# Patient Record
Sex: FEMALE | Race: WHITE | NOT HISPANIC OR LATINO | Employment: FULL TIME
[De-identification: names, ages, dates, MRNs, and addresses within clinical notes are randomized per-mention and may not be internally consistent; named-entity substitution may affect disease eponyms.]

---

## 2017-06-07 ENCOUNTER — IMAGING SERVICES (OUTPATIENT)
Dept: OTHER | Age: 52
End: 2017-06-07

## 2017-06-07 ENCOUNTER — HOSPITAL ENCOUNTER (OUTPATIENT)
Dept: MAMMOGRAPHY | Age: 52
Discharge: HOME | End: 2017-06-07
Attending: INTERNAL MEDICINE
Payer: COMMERCIAL

## 2017-06-07 ENCOUNTER — HOSPITAL ENCOUNTER (OUTPATIENT)
Dept: BONE DENSITY | Age: 52
Discharge: HOME | End: 2017-06-07
Attending: INTERNAL MEDICINE
Payer: COMMERCIAL

## 2017-06-07 DIAGNOSIS — M81.0 OSTEOPOROSIS: ICD-10-CM

## 2017-06-07 DIAGNOSIS — Z12.31 ENCOUNTER FOR SCREENING MAMMOGRAM FOR MALIGNANT NEOPLASM OF BREAST: ICD-10-CM

## 2017-06-07 PROCEDURE — 77080 DXA BONE DENSITY AXIAL: CPT | Performed by: INTERNAL MEDICINE

## 2017-06-07 PROCEDURE — 77067 SCR MAMMO BI INCL CAD: CPT | Performed by: INTERNAL MEDICINE

## 2017-06-19 ENCOUNTER — HOSPITAL ENCOUNTER (OUTPATIENT)
Dept: ULTRASOUND IMAGING | Age: 52
Discharge: HOME | End: 2017-06-19
Attending: INTERNAL MEDICINE
Payer: COMMERCIAL

## 2017-06-19 ENCOUNTER — IMAGING SERVICES (OUTPATIENT)
Dept: OTHER | Age: 52
End: 2017-06-19

## 2017-06-19 ENCOUNTER — HOSPITAL ENCOUNTER (OUTPATIENT)
Dept: MAMMOGRAPHY | Age: 52
Discharge: HOME | End: 2017-06-19
Attending: INTERNAL MEDICINE
Payer: COMMERCIAL

## 2017-06-19 DIAGNOSIS — R92.2 INCONCLUSIVE MAMMOGRAM: ICD-10-CM

## 2017-06-19 PROCEDURE — 76641 ULTRASOUND BREAST COMPLETE: CPT | Performed by: INTERNAL MEDICINE

## 2017-06-19 PROCEDURE — 77062 BREAST TOMOSYNTHESIS BI: CPT | Performed by: INTERNAL MEDICINE

## 2017-06-19 PROCEDURE — 77066 DX MAMMO INCL CAD BI: CPT | Performed by: INTERNAL MEDICINE

## 2017-07-31 ENCOUNTER — OCC HEALTH (OUTPATIENT)
Dept: OCCUPATIONAL MEDICINE | Age: 52
End: 2017-07-31
Attending: PHYSICIAN ASSISTANT

## 2020-02-20 ENCOUNTER — OFFICE VISIT (OUTPATIENT)
Dept: FAMILY MEDICINE CLINIC | Facility: CLINIC | Age: 55
End: 2020-02-20
Payer: COMMERCIAL

## 2020-02-20 VITALS
WEIGHT: 227.19 LBS | HEIGHT: 64 IN | BODY MASS INDEX: 38.79 KG/M2 | OXYGEN SATURATION: 99 % | DIASTOLIC BLOOD PRESSURE: 92 MMHG | TEMPERATURE: 99 F | RESPIRATION RATE: 16 BRPM | SYSTOLIC BLOOD PRESSURE: 150 MMHG | HEART RATE: 87 BPM

## 2020-02-20 DIAGNOSIS — Z20.828 EXPOSURE TO INFLUENZA: ICD-10-CM

## 2020-02-20 DIAGNOSIS — J02.9 SORE THROAT: Primary | ICD-10-CM

## 2020-02-20 LAB
CONTROL LINE PRESENT WITH A CLEAR BACKGROUND (YES/NO): YES YES/NO
KIT LOT #: NORMAL NUMERIC

## 2020-02-20 PROCEDURE — 99202 OFFICE O/P NEW SF 15 MIN: CPT | Performed by: NURSE PRACTITIONER

## 2020-02-20 PROCEDURE — 87880 STREP A ASSAY W/OPTIC: CPT | Performed by: NURSE PRACTITIONER

## 2020-02-20 RX ORDER — BUPROPION HYDROCHLORIDE 75 MG/1
75 TABLET ORAL
COMMUNITY
Start: 2020-02-12

## 2020-02-20 RX ORDER — FLUOXETINE 10 MG/1
10 CAPSULE ORAL
COMMUNITY
Start: 2020-01-15

## 2020-02-20 RX ORDER — OSELTAMIVIR PHOSPHATE 75 MG/1
75 CAPSULE ORAL DAILY
Qty: 10 CAPSULE | Refills: 0 | Status: SHIPPED | OUTPATIENT
Start: 2020-02-20 | End: 2020-03-01

## 2020-02-20 NOTE — PATIENT INSTRUCTIONS
Self-Care for Sore Throats    Sore throats happen for many reasons, such as colds, allergies, and infections caused by viruses or bacteria. In any case, your throat becomes red and sore.  Your goal for self-care is to reduce your discomfort while giving y over 101°F (38.3°C)  · White spots on the throat  · Great difficulty swallowing  · Trouble breathing  · A skin rash  · Recent exposure to someone else with strep bacteria  · Severe hoarseness and swollen glands in the neck or jaw  Date Last Reviewed: 8/1/2 get colds in the winter and spring, but it is possible to get a cold any time of the year. Symptoms usually include:    *sore throat  *runny nose  *coughing  *sneezing  *headaches  *body aches    Most people recover within about 7-10 days.  However, people shaking hands. Move away from people before coughing or sneezing. Cough and sneeze into a tissue then throw it away, or cough and sneeze into your upper shirt sleeve, completely covering your mouth and nose.   Wash your hands after coughing, sneezing, or common cold, but rhinoviruses are the most common. Rhinoviruses can also trigger asthma attacks and have been linked to sinus and ear infections.  Other viruses that can cause colds include respiratory syncytial virus, human parainfluenza viruses, adenoviru

## 2022-09-24 ENCOUNTER — TELEPHONE (OUTPATIENT)
Dept: SCHEDULING | Age: 57
End: 2022-09-24

## 2022-10-05 RX ORDER — BUPROPION HYDROCHLORIDE 300 MG/1
TABLET ORAL
Qty: 90 TABLET | Refills: 1 | Status: SHIPPED | OUTPATIENT
Start: 2022-10-05 | End: 2023-05-30 | Stop reason: SDUPTHER

## 2022-10-14 ENCOUNTER — TELEPHONE (OUTPATIENT)
Dept: SCHEDULING | Age: 57
End: 2022-10-14

## 2022-10-25 RX ORDER — LOSARTAN POTASSIUM AND HYDROCHLOROTHIAZIDE 12.5; 5 MG/1; MG/1
TABLET ORAL
Qty: 90 TABLET | Refills: 1 | Status: SHIPPED | OUTPATIENT
Start: 2022-10-25 | End: 2023-04-03

## 2023-01-17 RX ORDER — MONTELUKAST SODIUM 10 MG/1
TABLET ORAL
Qty: 90 TABLET | Refills: 1 | Status: SHIPPED | OUTPATIENT
Start: 2023-01-17 | End: 2023-05-30

## 2023-02-21 ENCOUNTER — APPOINTMENT (OUTPATIENT)
Dept: INTERNAL MEDICINE | Age: 58
End: 2023-02-21

## 2023-04-03 ENCOUNTER — APPOINTMENT (OUTPATIENT)
Dept: INTERNAL MEDICINE | Age: 58
End: 2023-04-03

## 2023-04-03 RX ORDER — LOSARTAN POTASSIUM AND HYDROCHLOROTHIAZIDE 12.5; 5 MG/1; MG/1
TABLET ORAL
Qty: 90 TABLET | Refills: 1 | Status: SHIPPED | OUTPATIENT
Start: 2023-04-03 | End: 2023-11-02

## 2023-05-24 ENCOUNTER — OFFICE VISIT (OUTPATIENT)
Dept: INTERNAL MEDICINE | Age: 58
End: 2023-05-24

## 2023-05-24 ENCOUNTER — LAB SERVICES (OUTPATIENT)
Dept: LAB | Age: 58
End: 2023-05-24

## 2023-05-24 VITALS
BODY MASS INDEX: 44.66 KG/M2 | TEMPERATURE: 97.9 F | DIASTOLIC BLOOD PRESSURE: 84 MMHG | HEIGHT: 64 IN | HEART RATE: 79 BPM | WEIGHT: 261.58 LBS | OXYGEN SATURATION: 98 % | RESPIRATION RATE: 18 BRPM | SYSTOLIC BLOOD PRESSURE: 128 MMHG

## 2023-05-24 DIAGNOSIS — Z11.59 NEED FOR HEPATITIS C SCREENING TEST: ICD-10-CM

## 2023-05-24 DIAGNOSIS — Z12.31 SCREENING MAMMOGRAM FOR BREAST CANCER: ICD-10-CM

## 2023-05-24 DIAGNOSIS — E55.9 VITAMIN D DEFICIENCY DISEASE: ICD-10-CM

## 2023-05-24 DIAGNOSIS — Z12.11 SPECIAL SCREENING FOR MALIGNANT NEOPLASM OF COLON: ICD-10-CM

## 2023-05-24 DIAGNOSIS — R73.9 HYPERGLYCEMIA: ICD-10-CM

## 2023-05-24 DIAGNOSIS — Z00.00 ENCOUNTER FOR GENERAL ADULT MEDICAL EXAMINATION W/O ABNORMAL FINDINGS: Primary | ICD-10-CM

## 2023-05-24 DIAGNOSIS — Z23 NEED FOR VACCINATION: ICD-10-CM

## 2023-05-24 DIAGNOSIS — Z00.00 ENCOUNTER FOR GENERAL ADULT MEDICAL EXAMINATION W/O ABNORMAL FINDINGS: ICD-10-CM

## 2023-05-24 DIAGNOSIS — Z13.820 OSTEOPOROSIS SCREENING: ICD-10-CM

## 2023-05-24 PROCEDURE — 90715 TDAP VACCINE 7 YRS/> IM: CPT | Performed by: INTERNAL MEDICINE

## 2023-05-24 PROCEDURE — 36415 COLL VENOUS BLD VENIPUNCTURE: CPT | Performed by: INTERNAL MEDICINE

## 2023-05-24 PROCEDURE — 82306 VITAMIN D 25 HYDROXY: CPT | Performed by: INTERNAL MEDICINE

## 2023-05-24 PROCEDURE — 99386 PREV VISIT NEW AGE 40-64: CPT | Performed by: INTERNAL MEDICINE

## 2023-05-24 PROCEDURE — 80050 GENERAL HEALTH PANEL: CPT | Performed by: INTERNAL MEDICINE

## 2023-05-24 PROCEDURE — 90471 IMMUNIZATION ADMIN: CPT | Performed by: INTERNAL MEDICINE

## 2023-05-24 PROCEDURE — 80061 LIPID PANEL: CPT | Performed by: INTERNAL MEDICINE

## 2023-05-24 PROCEDURE — 88175 CYTOPATH C/V AUTO FLUID REDO: CPT | Performed by: INTERNAL MEDICINE

## 2023-05-24 PROCEDURE — 83036 HEMOGLOBIN GLYCOSYLATED A1C: CPT | Performed by: INTERNAL MEDICINE

## 2023-05-24 PROCEDURE — 84439 ASSAY OF FREE THYROXINE: CPT | Performed by: INTERNAL MEDICINE

## 2023-05-24 RX ORDER — ALPRAZOLAM 0.5 MG/1
TABLET ORAL
COMMUNITY
End: 2023-05-26 | Stop reason: SDUPTHER

## 2023-05-24 RX ORDER — ALBUTEROL SULFATE 90 UG/1
AEROSOL, METERED RESPIRATORY (INHALATION)
COMMUNITY
End: 2023-05-26 | Stop reason: SDUPTHER

## 2023-05-24 ASSESSMENT — ENCOUNTER SYMPTOMS
PSYCHIATRIC NEGATIVE: 1
GASTROINTESTINAL NEGATIVE: 1
HEMATOLOGIC/LYMPHATIC NEGATIVE: 1
ALLERGIC/IMMUNOLOGIC NEGATIVE: 1
ENDOCRINE NEGATIVE: 1
NEUROLOGICAL NEGATIVE: 1
CONSTITUTIONAL NEGATIVE: 1
EYES NEGATIVE: 1
RESPIRATORY NEGATIVE: 1

## 2023-05-24 ASSESSMENT — PATIENT HEALTH QUESTIONNAIRE - PHQ9
SUM OF ALL RESPONSES TO PHQ9 QUESTIONS 1 AND 2: 0
1. LITTLE INTEREST OR PLEASURE IN DOING THINGS: NOT AT ALL
2. FEELING DOWN, DEPRESSED OR HOPELESS: NOT AT ALL
SUM OF ALL RESPONSES TO PHQ9 QUESTIONS 1 AND 2: 0
CLINICAL INTERPRETATION OF PHQ2 SCORE: NO FURTHER SCREENING NEEDED

## 2023-05-24 ASSESSMENT — PAIN SCALES - GENERAL: PAINLEVEL: 0

## 2023-05-25 LAB
25(OH)D3+25(OH)D2 SERPL-MCNC: 32.6 NG/ML (ref 30–100)
ALBUMIN SERPL-MCNC: 3.5 G/DL (ref 3.6–5.1)
ALBUMIN/GLOB SERPL: 1.1 {RATIO} (ref 1–2.4)
ALP SERPL-CCNC: 114 UNITS/L (ref 45–117)
ALT SERPL-CCNC: 30 UNITS/L
ANION GAP SERPL CALC-SCNC: 6 MMOL/L (ref 7–19)
AST SERPL-CCNC: 17 UNITS/L
BASOPHILS # BLD: 0.1 K/MCL (ref 0–0.3)
BASOPHILS NFR BLD: 1 %
BILIRUB SERPL-MCNC: 0.2 MG/DL (ref 0.2–1)
BUN SERPL-MCNC: 15 MG/DL (ref 6–20)
BUN/CREAT SERPL: 23 (ref 7–25)
CALCIUM SERPL-MCNC: 9.5 MG/DL (ref 8.4–10.2)
CHLORIDE SERPL-SCNC: 103 MMOL/L (ref 97–110)
CHOLEST SERPL-MCNC: 210 MG/DL
CHOLEST/HDLC SERPL: 2.1 {RATIO}
CO2 SERPL-SCNC: 30 MMOL/L (ref 21–32)
CREAT SERPL-MCNC: 0.64 MG/DL (ref 0.51–0.95)
DEPRECATED RDW RBC: 47.2 FL (ref 39–50)
EOSINOPHIL # BLD: 0.3 K/MCL (ref 0–0.5)
EOSINOPHIL NFR BLD: 3 %
ERYTHROCYTE [DISTWIDTH] IN BLOOD: 13.4 % (ref 11–15)
FASTING DURATION TIME PATIENT: ABNORMAL H
GFR SERPLBLD BASED ON 1.73 SQ M-ARVRAT: >90 ML/MIN
GLOBULIN SER-MCNC: 3.2 G/DL (ref 2–4)
GLUCOSE SERPL-MCNC: 123 MG/DL (ref 70–99)
HBA1C MFR BLD: 6.5 % (ref 4.5–5.6)
HCT VFR BLD CALC: 45.2 % (ref 36–46.5)
HDLC SERPL-MCNC: 99 MG/DL
HGB BLD-MCNC: 14.1 G/DL (ref 12–15.5)
IMM GRANULOCYTES # BLD AUTO: 0 K/MCL (ref 0–0.2)
IMM GRANULOCYTES # BLD: 0 %
LDLC SERPL CALC-MCNC: 99 MG/DL
LYMPHOCYTES # BLD: 2.3 K/MCL (ref 1–4)
LYMPHOCYTES NFR BLD: 25 %
MCH RBC QN AUTO: 29.9 PG (ref 26–34)
MCHC RBC AUTO-ENTMCNC: 31.2 G/DL (ref 32–36.5)
MCV RBC AUTO: 95.8 FL (ref 78–100)
MONOCYTES # BLD: 0.5 K/MCL (ref 0.3–0.9)
MONOCYTES NFR BLD: 5 %
NEUTROPHILS # BLD: 6.2 K/MCL (ref 1.8–7.7)
NEUTROPHILS NFR BLD: 66 %
NONHDLC SERPL-MCNC: 111 MG/DL
NRBC BLD MANUAL-RTO: 0 /100 WBC
PLATELET # BLD AUTO: 365 K/MCL (ref 140–450)
POTASSIUM SERPL-SCNC: 4.8 MMOL/L (ref 3.4–5.1)
PROT SERPL-MCNC: 6.7 G/DL (ref 6.4–8.2)
RBC # BLD: 4.72 MIL/MCL (ref 4–5.2)
SODIUM SERPL-SCNC: 134 MMOL/L (ref 135–145)
T4 FREE SERPL-MCNC: 1 NG/DL (ref 0.8–1.5)
TRIGL SERPL-MCNC: 62 MG/DL
TSH SERPL-ACNC: 2.4 MCUNITS/ML (ref 0.35–5)
WBC # BLD: 9.4 K/MCL (ref 4.2–11)

## 2023-05-26 DIAGNOSIS — J45.909 UNCOMPLICATED ASTHMA, UNSPECIFIED ASTHMA SEVERITY, UNSPECIFIED WHETHER PERSISTENT: Primary | ICD-10-CM

## 2023-05-26 LAB — HOLD SPECIMEN: NORMAL

## 2023-05-26 RX ORDER — ALBUTEROL SULFATE 90 UG/1
2 AEROSOL, METERED RESPIRATORY (INHALATION) EVERY 4 HOURS PRN
Qty: 1 EACH | Refills: 1 | Status: SHIPPED | OUTPATIENT
Start: 2023-05-26 | End: 2023-11-02 | Stop reason: SDUPTHER

## 2023-05-26 RX ORDER — ALPRAZOLAM 0.5 MG/1
0.5 TABLET ORAL NIGHTLY PRN
Qty: 30 TABLET | Refills: 0 | Status: SHIPPED | OUTPATIENT
Start: 2023-05-26 | End: 2023-11-02 | Stop reason: SDUPTHER

## 2023-05-26 RX ORDER — DICLOFENAC SODIUM 75 MG/1
75 TABLET, DELAYED RELEASE ORAL
COMMUNITY
Start: 2023-04-27 | End: 2023-05-27

## 2023-05-27 DIAGNOSIS — J30.1 SEASONAL ALLERGIC RHINITIS DUE TO POLLEN: Primary | ICD-10-CM

## 2023-05-30 ENCOUNTER — TELEPHONE (OUTPATIENT)
Dept: SURGERY | Age: 58
End: 2023-05-30

## 2023-05-30 LAB
CASE RPRT: NORMAL
CLINICAL INFO: NORMAL
CYTOLOGY CVX/VAG STUDY: NORMAL
PAP EDUCATIONAL NOTE: NORMAL
SPECIMEN ADEQUACY: NORMAL

## 2023-05-30 RX ORDER — BUPROPION HYDROCHLORIDE 300 MG/1
300 TABLET ORAL DAILY
Qty: 90 TABLET | Refills: 1 | Status: SHIPPED | OUTPATIENT
Start: 2023-05-30 | End: 2023-07-12 | Stop reason: DRUGHIGH

## 2023-05-30 RX ORDER — MONTELUKAST SODIUM 10 MG/1
TABLET ORAL
Qty: 90 TABLET | Refills: 1 | Status: SHIPPED | OUTPATIENT
Start: 2023-05-30

## 2023-05-31 ENCOUNTER — EXTERNAL RECORD (OUTPATIENT)
Dept: OTHER | Age: 58
End: 2023-05-31

## 2023-07-13 RX ORDER — BUPROPION HYDROCHLORIDE 450 MG/1
450 TABLET, FILM COATED, EXTENDED RELEASE ORAL DAILY
Qty: 90 TABLET | Refills: 3 | Status: SHIPPED | OUTPATIENT
Start: 2023-07-13 | End: 2023-11-02 | Stop reason: SDUPTHER

## 2023-10-03 ENCOUNTER — IMAGING SERVICES (OUTPATIENT)
Dept: MAMMOGRAPHY | Age: 58
End: 2023-10-03

## 2023-10-03 DIAGNOSIS — Z12.31 SCREENING MAMMOGRAM FOR BREAST CANCER: ICD-10-CM

## 2023-10-03 PROCEDURE — 77063 BREAST TOMOSYNTHESIS BI: CPT | Performed by: RADIOLOGY

## 2023-10-03 PROCEDURE — 77067 SCR MAMMO BI INCL CAD: CPT | Performed by: RADIOLOGY

## 2023-10-09 ENCOUNTER — TELEPHONE (OUTPATIENT)
Dept: FAMILY MEDICINE | Age: 58
End: 2023-10-09

## 2023-11-02 DIAGNOSIS — J45.909 UNCOMPLICATED ASTHMA, UNSPECIFIED ASTHMA SEVERITY, UNSPECIFIED WHETHER PERSISTENT: ICD-10-CM

## 2023-11-02 RX ORDER — ALBUTEROL SULFATE 90 UG/1
2 AEROSOL, METERED RESPIRATORY (INHALATION) EVERY 4 HOURS PRN
Qty: 1 EACH | Refills: 1 | Status: SHIPPED | OUTPATIENT
Start: 2023-11-02 | End: 2023-12-02

## 2023-11-02 RX ORDER — LOSARTAN POTASSIUM AND HYDROCHLOROTHIAZIDE 12.5; 5 MG/1; MG/1
TABLET ORAL
Qty: 90 TABLET | Refills: 1 | Status: SHIPPED | OUTPATIENT
Start: 2023-11-02

## 2023-11-02 RX ORDER — BUPROPION HYDROCHLORIDE 450 MG/1
450 TABLET, FILM COATED, EXTENDED RELEASE ORAL DAILY
Qty: 90 TABLET | Refills: 3 | Status: SHIPPED | OUTPATIENT
Start: 2023-11-02

## 2023-11-02 RX ORDER — ALPRAZOLAM 0.5 MG/1
0.5 TABLET ORAL NIGHTLY PRN
Qty: 30 TABLET | Refills: 0 | OUTPATIENT
Start: 2023-11-02

## 2023-11-02 RX ORDER — ALPRAZOLAM 0.5 MG/1
0.5 TABLET ORAL
Qty: 30 TABLET | Refills: 0 | OUTPATIENT
Start: 2023-11-02

## 2023-11-02 RX ORDER — LOSARTAN POTASSIUM AND HYDROCHLOROTHIAZIDE 12.5; 5 MG/1; MG/1
1 TABLET ORAL DAILY
Qty: 90 TABLET | Refills: 1 | OUTPATIENT
Start: 2023-11-02

## 2023-11-30 ENCOUNTER — V-VISIT (OUTPATIENT)
Dept: FAMILY MEDICINE | Age: 58
End: 2023-11-30

## 2023-11-30 VITALS — HEIGHT: 64 IN | WEIGHT: 257 LBS | BODY MASS INDEX: 43.87 KG/M2

## 2023-11-30 DIAGNOSIS — F32.5 MAJOR DEPRESSIVE DISORDER IN FULL REMISSION, UNSPECIFIED WHETHER RECURRENT (CMD): ICD-10-CM

## 2023-11-30 DIAGNOSIS — R53.83 OTHER FATIGUE: Primary | ICD-10-CM

## 2023-11-30 DIAGNOSIS — R06.81 APNEA: ICD-10-CM

## 2023-11-30 DIAGNOSIS — R73.9 HYPERGLYCEMIA: ICD-10-CM

## 2023-11-30 PROCEDURE — 99214 OFFICE O/P EST MOD 30 MIN: CPT | Performed by: FAMILY MEDICINE

## 2023-11-30 RX ORDER — BUPROPION HYDROCHLORIDE 300 MG/1
300 TABLET ORAL DAILY
Qty: 90 TABLET | Refills: 3 | Status: SHIPPED | OUTPATIENT
Start: 2023-11-30

## 2023-11-30 ASSESSMENT — ENCOUNTER SYMPTOMS
RESPIRATORY NEGATIVE: 1
PSYCHIATRIC NEGATIVE: 1
CONSTITUTIONAL NEGATIVE: 1
NEUROLOGICAL NEGATIVE: 1
GASTROINTESTINAL NEGATIVE: 1

## 2023-11-30 ASSESSMENT — PATIENT HEALTH QUESTIONNAIRE - PHQ9
1. LITTLE INTEREST OR PLEASURE IN DOING THINGS: NOT AT ALL
SUM OF ALL RESPONSES TO PHQ9 QUESTIONS 1 AND 2: 0
SUM OF ALL RESPONSES TO PHQ9 QUESTIONS 1 AND 2: 0
CLINICAL INTERPRETATION OF PHQ2 SCORE: NO FURTHER SCREENING NEEDED
2. FEELING DOWN, DEPRESSED OR HOPELESS: NOT AT ALL

## 2023-12-04 ENCOUNTER — LAB SERVICES (OUTPATIENT)
Dept: LAB | Age: 58
End: 2023-12-04

## 2023-12-04 DIAGNOSIS — R73.9 HYPERGLYCEMIA: ICD-10-CM

## 2023-12-04 PROCEDURE — 36415 COLL VENOUS BLD VENIPUNCTURE: CPT | Performed by: FAMILY MEDICINE

## 2023-12-04 PROCEDURE — 83036 HEMOGLOBIN GLYCOSYLATED A1C: CPT | Performed by: CLINICAL MEDICAL LABORATORY

## 2023-12-05 ENCOUNTER — TELEPHONE (OUTPATIENT)
Dept: FAMILY MEDICINE | Age: 58
End: 2023-12-05

## 2023-12-05 LAB — HBA1C MFR BLD: 6.7 % (ref 4.5–5.6)

## 2023-12-07 ENCOUNTER — OFFICE VISIT (OUTPATIENT)
Dept: FAMILY MEDICINE | Age: 58
End: 2023-12-07

## 2023-12-07 VITALS
BODY MASS INDEX: 43.55 KG/M2 | HEIGHT: 64 IN | WEIGHT: 255.07 LBS | SYSTOLIC BLOOD PRESSURE: 119 MMHG | DIASTOLIC BLOOD PRESSURE: 78 MMHG | HEART RATE: 95 BPM | RESPIRATION RATE: 16 BRPM | OXYGEN SATURATION: 95 %

## 2023-12-07 DIAGNOSIS — E11.9 NEW ONSET TYPE 2 DIABETES MELLITUS (CMD): Primary | Chronic | ICD-10-CM

## 2023-12-07 DIAGNOSIS — E55.9 VITAMIN D DEFICIENCY DISEASE: ICD-10-CM

## 2023-12-07 DIAGNOSIS — E66.01 CLASS 3 SEVERE OBESITY DUE TO EXCESS CALORIES WITH BODY MASS INDEX (BMI) OF 40.0 TO 44.9 IN ADULT, UNSPECIFIED WHETHER SERIOUS COMORBIDITY PRESENT (CMD): Chronic | ICD-10-CM

## 2023-12-07 DIAGNOSIS — R06.83 SNORING: ICD-10-CM

## 2023-12-07 DIAGNOSIS — R53.82 CHRONIC FATIGUE: Chronic | ICD-10-CM

## 2023-12-07 PROBLEM — E66.9 OBESITY: Status: ACTIVE | Noted: 2023-12-07

## 2023-12-07 PROBLEM — F32.A DEPRESSION: Status: ACTIVE | Noted: 2023-12-07

## 2023-12-07 PROBLEM — M81.0 OSTEOPOROSIS: Status: ACTIVE | Noted: 2023-12-07

## 2023-12-07 PROBLEM — I10 ESSENTIAL HYPERTENSION: Status: ACTIVE | Noted: 2023-12-07

## 2023-12-07 PROBLEM — E78.2 HYPERLIPEMIA, MIXED: Status: ACTIVE | Noted: 2023-12-07

## 2023-12-07 PROCEDURE — 3074F SYST BP LT 130 MM HG: CPT | Performed by: STUDENT IN AN ORGANIZED HEALTH CARE EDUCATION/TRAINING PROGRAM

## 2023-12-07 PROCEDURE — 99214 OFFICE O/P EST MOD 30 MIN: CPT | Performed by: STUDENT IN AN ORGANIZED HEALTH CARE EDUCATION/TRAINING PROGRAM

## 2023-12-07 PROCEDURE — 3078F DIAST BP <80 MM HG: CPT | Performed by: STUDENT IN AN ORGANIZED HEALTH CARE EDUCATION/TRAINING PROGRAM

## 2023-12-07 RX ORDER — SEMAGLUTIDE 1.34 MG/ML
INJECTION, SOLUTION SUBCUTANEOUS
Qty: 5 ML | Refills: 3 | Status: SHIPPED | OUTPATIENT
Start: 2023-12-07

## 2023-12-07 ASSESSMENT — PATIENT HEALTH QUESTIONNAIRE - PHQ9
CLINICAL INTERPRETATION OF PHQ2 SCORE: NO FURTHER SCREENING NEEDED
SUM OF ALL RESPONSES TO PHQ9 QUESTIONS 1 AND 2: 0
SUM OF ALL RESPONSES TO PHQ9 QUESTIONS 1 AND 2: 0
1. LITTLE INTEREST OR PLEASURE IN DOING THINGS: NOT AT ALL
2. FEELING DOWN, DEPRESSED OR HOPELESS: NOT AT ALL

## 2023-12-07 ASSESSMENT — ENCOUNTER SYMPTOMS: FATIGUE: 1

## 2023-12-15 ENCOUNTER — TELEPHONE (OUTPATIENT)
Dept: INTERNAL MEDICINE | Age: 58
End: 2023-12-15

## 2024-01-01 ENCOUNTER — EXTERNAL RECORD (OUTPATIENT)
Dept: HEALTH INFORMATION MANAGEMENT | Facility: OTHER | Age: 59
End: 2024-01-01

## 2024-01-07 ENCOUNTER — EXTERNAL RECORD (OUTPATIENT)
Dept: HEALTH INFORMATION MANAGEMENT | Facility: OTHER | Age: 59
End: 2024-01-07

## 2024-01-10 ENCOUNTER — TELEPHONE (OUTPATIENT)
Dept: FAMILY MEDICINE | Age: 59
End: 2024-01-10

## 2024-01-10 DIAGNOSIS — G47.33 OSA (OBSTRUCTIVE SLEEP APNEA): Primary | Chronic | ICD-10-CM

## 2024-01-11 ENCOUNTER — TELEPHONE (OUTPATIENT)
Dept: INTERNAL MEDICINE | Age: 59
End: 2024-01-11

## 2024-01-16 DIAGNOSIS — J30.1 SEASONAL ALLERGIC RHINITIS DUE TO POLLEN: ICD-10-CM

## 2024-01-16 RX ORDER — MONTELUKAST SODIUM 10 MG/1
TABLET ORAL
Qty: 90 TABLET | Refills: 1 | Status: SHIPPED | OUTPATIENT
Start: 2024-01-16

## 2024-01-17 ENCOUNTER — E-ADVICE (OUTPATIENT)
Dept: FAMILY MEDICINE | Age: 59
End: 2024-01-17

## 2024-01-17 DIAGNOSIS — G47.33 OSA (OBSTRUCTIVE SLEEP APNEA): Primary | Chronic | ICD-10-CM

## 2024-01-24 ENCOUNTER — TELEPHONE (OUTPATIENT)
Dept: FAMILY MEDICINE | Age: 59
End: 2024-01-24

## 2024-01-24 ENCOUNTER — E-ADVICE (OUTPATIENT)
Dept: INTERNAL MEDICINE | Age: 59
End: 2024-01-24

## 2024-01-24 DIAGNOSIS — E11.9 NEW ONSET TYPE 2 DIABETES MELLITUS (CMD): Chronic | ICD-10-CM

## 2024-01-24 DIAGNOSIS — E66.01 CLASS 3 SEVERE OBESITY DUE TO EXCESS CALORIES WITH BODY MASS INDEX (BMI) OF 40.0 TO 44.9 IN ADULT, UNSPECIFIED WHETHER SERIOUS COMORBIDITY PRESENT (CMD): Chronic | ICD-10-CM

## 2024-01-24 RX ORDER — SEMAGLUTIDE 1.34 MG/ML
INJECTION, SOLUTION SUBCUTANEOUS
Qty: 5 ML | Refills: 3 | Status: SHIPPED | OUTPATIENT
Start: 2024-01-24

## 2024-02-07 ENCOUNTER — EXTERNAL RECORD (OUTPATIENT)
Dept: HEALTH INFORMATION MANAGEMENT | Facility: OTHER | Age: 59
End: 2024-02-07

## 2024-02-07 PROBLEM — E11.9 TYPE 2 DIABETES MELLITUS WITHOUT COMPLICATION, WITHOUT LONG-TERM CURRENT USE OF INSULIN  (CMD): Chronic | Status: ACTIVE | Noted: 2024-02-07

## 2024-02-21 ENCOUNTER — EXTERNAL RECORD (OUTPATIENT)
Dept: HEALTH INFORMATION MANAGEMENT | Facility: OTHER | Age: 59
End: 2024-02-21

## 2024-03-04 DIAGNOSIS — E66.01 CLASS 3 SEVERE OBESITY DUE TO EXCESS CALORIES WITH BODY MASS INDEX (BMI) OF 40.0 TO 44.9 IN ADULT, UNSPECIFIED WHETHER SERIOUS COMORBIDITY PRESENT (CMD): Chronic | ICD-10-CM

## 2024-03-04 DIAGNOSIS — E11.9 NEW ONSET TYPE 2 DIABETES MELLITUS (CMD): Chronic | ICD-10-CM

## 2024-03-07 ENCOUNTER — APPOINTMENT (OUTPATIENT)
Dept: INTERNAL MEDICINE | Age: 59
End: 2024-03-07

## 2024-03-07 VITALS
HEIGHT: 64 IN | OXYGEN SATURATION: 97 % | HEART RATE: 90 BPM | RESPIRATION RATE: 18 BRPM | BODY MASS INDEX: 44.71 KG/M2 | SYSTOLIC BLOOD PRESSURE: 110 MMHG | DIASTOLIC BLOOD PRESSURE: 76 MMHG | WEIGHT: 261.91 LBS | TEMPERATURE: 98 F

## 2024-03-07 DIAGNOSIS — Z12.11 SPECIAL SCREENING FOR MALIGNANT NEOPLASM OF COLON: ICD-10-CM

## 2024-03-07 DIAGNOSIS — E11.9 NEW ONSET TYPE 2 DIABETES MELLITUS (CMD): Primary | ICD-10-CM

## 2024-03-07 DIAGNOSIS — Z13.820 OSTEOPOROSIS SCREENING: ICD-10-CM

## 2024-03-07 RX ORDER — ROSUVASTATIN CALCIUM 5 MG/1
5 TABLET, COATED ORAL DAILY
Qty: 90 TABLET | Refills: 3 | Status: SHIPPED | OUTPATIENT
Start: 2024-03-07

## 2024-03-07 ASSESSMENT — PAIN SCALES - GENERAL: PAINLEVEL: 0

## 2024-03-07 ASSESSMENT — PATIENT HEALTH QUESTIONNAIRE - PHQ9
2. FEELING DOWN, DEPRESSED OR HOPELESS: NOT AT ALL
1. LITTLE INTEREST OR PLEASURE IN DOING THINGS: NOT AT ALL
SUM OF ALL RESPONSES TO PHQ9 QUESTIONS 1 AND 2: 0
CLINICAL INTERPRETATION OF PHQ2 SCORE: NO FURTHER SCREENING NEEDED
SUM OF ALL RESPONSES TO PHQ9 QUESTIONS 1 AND 2: 0

## 2024-03-08 ASSESSMENT — ENCOUNTER SYMPTOMS
RESPIRATORY NEGATIVE: 1
FATIGUE: 1
HEADACHES: 1
HEMATOLOGIC/LYMPHATIC NEGATIVE: 1
ALLERGIC/IMMUNOLOGIC NEGATIVE: 1
EYES NEGATIVE: 1
ENDOCRINE NEGATIVE: 1
PSYCHIATRIC NEGATIVE: 1
GASTROINTESTINAL NEGATIVE: 1

## 2024-03-13 ENCOUNTER — HOSPITAL ENCOUNTER (INPATIENT)
Facility: HOSPITAL | Age: 59
LOS: 2 days | Discharge: HOME | End: 2024-03-15
Attending: EMERGENCY MEDICINE | Admitting: STUDENT IN AN ORGANIZED HEALTH CARE EDUCATION/TRAINING PROGRAM
Payer: COMMERCIAL

## 2024-03-13 ENCOUNTER — APPOINTMENT (OUTPATIENT)
Dept: GENERAL RADIOLOGY | Age: 59
End: 2024-03-13
Attending: PHYSICIAN ASSISTANT
Payer: COMMERCIAL

## 2024-03-13 ENCOUNTER — HOSPITAL ENCOUNTER (OUTPATIENT)
Age: 59
Discharge: ANOTHER HOSPITAL | End: 2024-03-13
Payer: COMMERCIAL

## 2024-03-13 VITALS
OXYGEN SATURATION: 96 % | HEART RATE: 104 BPM | DIASTOLIC BLOOD PRESSURE: 71 MMHG | RESPIRATION RATE: 21 BRPM | HEIGHT: 64 IN | TEMPERATURE: 101 F | SYSTOLIC BLOOD PRESSURE: 120 MMHG | WEIGHT: 258 LBS | BODY MASS INDEX: 44.05 KG/M2

## 2024-03-13 DIAGNOSIS — J45.51 SEVERE PERSISTENT ASTHMA WITH EXACERBATION (HCC): ICD-10-CM

## 2024-03-13 DIAGNOSIS — R50.81 FEVER IN OTHER DISEASES: ICD-10-CM

## 2024-03-13 DIAGNOSIS — R09.02 HYPOXIA: ICD-10-CM

## 2024-03-13 DIAGNOSIS — J10.1 INFLUENZA A: Primary | ICD-10-CM

## 2024-03-13 DIAGNOSIS — E87.1 HYPONATREMIA: ICD-10-CM

## 2024-03-13 PROBLEM — J45.901 EXACERBATION OF ASTHMA (HCC): Status: ACTIVE | Noted: 2024-03-13

## 2024-03-13 PROBLEM — R73.9 HYPERGLYCEMIA: Status: ACTIVE | Noted: 2024-03-13

## 2024-03-13 PROBLEM — E11.9 TYPE 2 DIABETES MELLITUS WITHOUT COMPLICATION, WITHOUT LONG-TERM CURRENT USE OF INSULIN (HCC): Status: ACTIVE | Noted: 2024-03-13

## 2024-03-13 PROBLEM — I10 PRIMARY HYPERTENSION: Status: ACTIVE | Noted: 2024-03-13

## 2024-03-13 LAB
ALBUMIN SERPL-MCNC: 3.3 G/DL (ref 3.4–5)
ALBUMIN/GLOB SERPL: 0.9 {RATIO} (ref 1–2)
ALP LIVER SERPL-CCNC: 93 U/L
ALT SERPL-CCNC: 23 U/L
ANION GAP SERPL CALC-SCNC: 7 MMOL/L (ref 0–18)
AST SERPL-CCNC: 18 U/L (ref 15–37)
BASOPHILS # BLD AUTO: 0.06 X10(3) UL (ref 0–0.2)
BASOPHILS NFR BLD AUTO: 0.9 %
BILIRUB SERPL-MCNC: 0.3 MG/DL (ref 0.1–2)
BUN BLD-MCNC: 9 MG/DL (ref 9–23)
CALCIUM BLD-MCNC: 9.1 MG/DL (ref 8.5–10.1)
CHLORIDE SERPL-SCNC: 100 MMOL/L (ref 98–112)
CO2 SERPL-SCNC: 25 MMOL/L (ref 21–32)
CREAT BLD-MCNC: 0.83 MG/DL
EGFRCR SERPLBLD CKD-EPI 2021: 82 ML/MIN/1.73M2 (ref 60–?)
EOSINOPHIL # BLD AUTO: 0.02 X10(3) UL (ref 0–0.7)
EOSINOPHIL NFR BLD AUTO: 0.3 %
ERYTHROCYTE [DISTWIDTH] IN BLOOD BY AUTOMATED COUNT: 12.1 %
GLOBULIN PLAS-MCNC: 3.7 G/DL (ref 2.8–4.4)
GLUCOSE BLD-MCNC: 109 MG/DL (ref 70–99)
GLUCOSE BLD-MCNC: 203 MG/DL (ref 70–99)
HCT VFR BLD AUTO: 39 %
HGB BLD-MCNC: 13.5 G/DL
IMM GRANULOCYTES # BLD AUTO: 0.03 X10(3) UL (ref 0–1)
IMM GRANULOCYTES NFR BLD: 0.5 %
LYMPHOCYTES # BLD AUTO: 0.57 X10(3) UL (ref 1–4)
LYMPHOCYTES NFR BLD AUTO: 8.6 %
MCH RBC QN AUTO: 30.1 PG (ref 26–34)
MCHC RBC AUTO-ENTMCNC: 34.6 G/DL (ref 31–37)
MCV RBC AUTO: 86.9 FL
MONOCYTES # BLD AUTO: 0.49 X10(3) UL (ref 0.1–1)
MONOCYTES NFR BLD AUTO: 7.4 %
NEUTROPHILS # BLD AUTO: 5.47 X10 (3) UL (ref 1.5–7.7)
NEUTROPHILS # BLD AUTO: 5.47 X10(3) UL (ref 1.5–7.7)
NEUTROPHILS NFR BLD AUTO: 82.3 %
OSMOLALITY SERPL CALC.SUM OF ELEC: 273 MOSM/KG (ref 275–295)
PLATELET # BLD AUTO: 284 10(3)UL (ref 150–450)
POCT INFLUENZA A: POSITIVE
POCT INFLUENZA B: NEGATIVE
POTASSIUM SERPL-SCNC: 3.8 MMOL/L (ref 3.5–5.1)
PROT SERPL-MCNC: 7 G/DL (ref 6.4–8.2)
RBC # BLD AUTO: 4.49 X10(6)UL
SARS-COV-2 RNA RESP QL NAA+PROBE: NOT DETECTED
SODIUM SERPL-SCNC: 132 MMOL/L (ref 136–145)
WBC # BLD AUTO: 6.6 X10(3) UL (ref 4–11)

## 2024-03-13 PROCEDURE — 99203 OFFICE O/P NEW LOW 30 MIN: CPT

## 2024-03-13 PROCEDURE — 87502 INFLUENZA DNA AMP PROBE: CPT | Performed by: PHYSICIAN ASSISTANT

## 2024-03-13 PROCEDURE — 5A09357 ASSISTANCE WITH RESPIRATORY VENTILATION, LESS THAN 24 CONSECUTIVE HOURS, CONTINUOUS POSITIVE AIRWAY PRESSURE: ICD-10-PCS | Performed by: STUDENT IN AN ORGANIZED HEALTH CARE EDUCATION/TRAINING PROGRAM

## 2024-03-13 PROCEDURE — 71046 X-RAY EXAM CHEST 2 VIEWS: CPT | Performed by: PHYSICIAN ASSISTANT

## 2024-03-13 PROCEDURE — 99223 1ST HOSP IP/OBS HIGH 75: CPT | Performed by: STUDENT IN AN ORGANIZED HEALTH CARE EDUCATION/TRAINING PROGRAM

## 2024-03-13 RX ORDER — BENZONATATE 200 MG/1
200 CAPSULE ORAL 3 TIMES DAILY PRN
Status: DISCONTINUED | OUTPATIENT
Start: 2024-03-13 | End: 2024-03-15

## 2024-03-13 RX ORDER — SODIUM CHLORIDE 9 MG/ML
INJECTION, SOLUTION INTRAVENOUS CONTINUOUS
Status: DISCONTINUED | OUTPATIENT
Start: 2024-03-13 | End: 2024-03-15

## 2024-03-13 RX ORDER — OSELTAMIVIR PHOSPHATE 75 MG/1
75 CAPSULE ORAL ONCE
Status: COMPLETED | OUTPATIENT
Start: 2024-03-13 | End: 2024-03-13

## 2024-03-13 RX ORDER — POLYETHYLENE GLYCOL 3350 17 G/17G
17 POWDER, FOR SOLUTION ORAL DAILY PRN
Status: DISCONTINUED | OUTPATIENT
Start: 2024-03-13 | End: 2024-03-15

## 2024-03-13 RX ORDER — MELATONIN
3 NIGHTLY PRN
Status: DISCONTINUED | OUTPATIENT
Start: 2024-03-13 | End: 2024-03-15

## 2024-03-13 RX ORDER — NICOTINE POLACRILEX 4 MG
30 LOZENGE BUCCAL
Status: DISCONTINUED | OUTPATIENT
Start: 2024-03-13 | End: 2024-03-15

## 2024-03-13 RX ORDER — PROCHLORPERAZINE EDISYLATE 5 MG/ML
5 INJECTION INTRAMUSCULAR; INTRAVENOUS EVERY 8 HOURS PRN
Status: DISCONTINUED | OUTPATIENT
Start: 2024-03-13 | End: 2024-03-15

## 2024-03-13 RX ORDER — NICOTINE POLACRILEX 4 MG
15 LOZENGE BUCCAL
Status: DISCONTINUED | OUTPATIENT
Start: 2024-03-13 | End: 2024-03-15

## 2024-03-13 RX ORDER — ACETAMINOPHEN 500 MG
1000 TABLET ORAL EVERY 8 HOURS PRN
Status: DISCONTINUED | OUTPATIENT
Start: 2024-03-13 | End: 2024-03-15

## 2024-03-13 RX ORDER — MAGNESIUM SULFATE HEPTAHYDRATE 40 MG/ML
2 INJECTION, SOLUTION INTRAVENOUS ONCE
Status: DISCONTINUED | OUTPATIENT
Start: 2024-03-13 | End: 2024-03-13

## 2024-03-13 RX ORDER — ONDANSETRON 2 MG/ML
4 INJECTION INTRAMUSCULAR; INTRAVENOUS EVERY 6 HOURS PRN
Status: DISCONTINUED | OUTPATIENT
Start: 2024-03-13 | End: 2024-03-15

## 2024-03-13 RX ORDER — IBUPROFEN 400 MG/1
400 TABLET ORAL EVERY 4 HOURS PRN
Status: DISCONTINUED | OUTPATIENT
Start: 2024-03-13 | End: 2024-03-15

## 2024-03-13 RX ORDER — MONTELUKAST SODIUM 10 MG/1
1 TABLET ORAL DAILY
COMMUNITY
Start: 2016-01-01

## 2024-03-13 RX ORDER — DEXTROSE MONOHYDRATE 25 G/50ML
50 INJECTION, SOLUTION INTRAVENOUS
Status: DISCONTINUED | OUTPATIENT
Start: 2024-03-13 | End: 2024-03-15

## 2024-03-13 RX ORDER — OSELTAMIVIR PHOSPHATE 75 MG/1
75 CAPSULE ORAL EVERY 12 HOURS SCHEDULED
Status: DISCONTINUED | OUTPATIENT
Start: 2024-03-14 | End: 2024-03-15

## 2024-03-13 RX ORDER — IBUPROFEN 600 MG/1
600 TABLET ORAL EVERY 4 HOURS PRN
Status: DISCONTINUED | OUTPATIENT
Start: 2024-03-13 | End: 2024-03-15

## 2024-03-13 RX ORDER — METHYLPREDNISOLONE SODIUM SUCCINATE 125 MG/2ML
125 INJECTION, POWDER, LYOPHILIZED, FOR SOLUTION INTRAMUSCULAR; INTRAVENOUS ONCE
Status: COMPLETED | OUTPATIENT
Start: 2024-03-13 | End: 2024-03-13

## 2024-03-13 RX ORDER — LOSARTAN POTASSIUM AND HYDROCHLOROTHIAZIDE 12.5; 5 MG/1; MG/1
1 TABLET ORAL DAILY
COMMUNITY
Start: 2023-11-02

## 2024-03-13 RX ORDER — MONTELUKAST SODIUM 10 MG/1
10 TABLET ORAL DAILY
Status: DISCONTINUED | OUTPATIENT
Start: 2024-03-13 | End: 2024-03-15

## 2024-03-13 RX ORDER — SODIUM CHLORIDE, SODIUM LACTATE, POTASSIUM CHLORIDE, CALCIUM CHLORIDE 600; 310; 30; 20 MG/100ML; MG/100ML; MG/100ML; MG/100ML
INJECTION, SOLUTION INTRAVENOUS CONTINUOUS
Status: DISCONTINUED | OUTPATIENT
Start: 2024-03-13 | End: 2024-03-14

## 2024-03-13 RX ORDER — ACETAMINOPHEN 500 MG
1000 TABLET ORAL ONCE
Status: COMPLETED | OUTPATIENT
Start: 2024-03-13 | End: 2024-03-13

## 2024-03-13 RX ORDER — BISACODYL 10 MG
10 SUPPOSITORY, RECTAL RECTAL
Status: DISCONTINUED | OUTPATIENT
Start: 2024-03-13 | End: 2024-03-15

## 2024-03-13 RX ORDER — IBUPROFEN 200 MG
200 TABLET ORAL EVERY 4 HOURS PRN
Status: DISCONTINUED | OUTPATIENT
Start: 2024-03-13 | End: 2024-03-15

## 2024-03-13 RX ORDER — MAGNESIUM SULFATE HEPTAHYDRATE 40 MG/ML
2 INJECTION, SOLUTION INTRAVENOUS ONCE
Status: COMPLETED | OUTPATIENT
Start: 2024-03-13 | End: 2024-03-13

## 2024-03-13 RX ORDER — METHYLPREDNISOLONE SODIUM SUCCINATE 40 MG/ML
40 INJECTION, POWDER, LYOPHILIZED, FOR SOLUTION INTRAMUSCULAR; INTRAVENOUS EVERY 8 HOURS
Status: DISCONTINUED | OUTPATIENT
Start: 2024-03-14 | End: 2024-03-15

## 2024-03-13 RX ORDER — SEMAGLUTIDE 0.68 MG/ML
INJECTION, SOLUTION SUBCUTANEOUS
COMMUNITY
Start: 2024-02-26

## 2024-03-13 RX ORDER — ENOXAPARIN SODIUM 100 MG/ML
0.5 INJECTION SUBCUTANEOUS DAILY
Status: DISCONTINUED | OUTPATIENT
Start: 2024-03-14 | End: 2024-03-15

## 2024-03-13 RX ORDER — ECHINACEA PURPUREA EXTRACT 125 MG
1 TABLET ORAL
Status: DISCONTINUED | OUTPATIENT
Start: 2024-03-13 | End: 2024-03-15

## 2024-03-13 RX ORDER — BUPROPION HYDROCHLORIDE 75 MG/1
150 TABLET ORAL DAILY
Status: DISCONTINUED | OUTPATIENT
Start: 2024-03-14 | End: 2024-03-15

## 2024-03-13 RX ORDER — SENNOSIDES 8.6 MG
17.2 TABLET ORAL NIGHTLY PRN
Status: DISCONTINUED | OUTPATIENT
Start: 2024-03-13 | End: 2024-03-15

## 2024-03-13 RX ORDER — ALBUTEROL SULFATE 2.5 MG/3ML
2.5 SOLUTION RESPIRATORY (INHALATION)
Status: DISCONTINUED | OUTPATIENT
Start: 2024-03-13 | End: 2024-03-15

## 2024-03-13 NOTE — ED PROVIDER NOTES
Patient Seen in: [facility] Emergency Department      History     Chief Complaint   Patient presents with    Difficulty Breathing     Stated Complaint: SOB, WEAKNESS, FLU A    Subjective:   HPI    Patient from [place] immediate care history of asthma, obstructive sleep apnea, diabetes, pneumonia, and asthma.  Flulike symptoms for the past 2 to 3 days not difficulty breathing today    Objective:   Past Medical History:   Diagnosis Date    Asthma (HCC)     CPAP (continuous positive airway pressure) dependence     Diabetes (HCC)     Pneumonia               Past Surgical History:   Procedure Laterality Date    NEEDLE BIOPSY LEFT      early 20's                Social History     Socioeconomic History    Marital status:    Tobacco Use    Smoking status: Never    Smokeless tobacco: Never     Social Determinants of Health     Food Insecurity: No Food Insecurity (3/13/2024)    Food Insecurity     Food Insecurity: Never true   Transportation Needs: No Transportation Needs (3/13/2024)    Transportation Needs     Lack of Transportation: No   Housing Stability: Low Risk  (3/13/2024)    Housing Stability     Housing Instability: No              Review of Systems    Positive for stated complaint: SOB, WEAKNESS, FLU A  Other systems are as noted in HPI.  Constitutional and vital signs reviewed.      All other systems reviewed and negative except as noted above.    Physical Exam     ED Triage Vitals   BP 03/13/24 1624 111/71   Pulse 03/13/24 1622 100   Resp 03/13/24 1622 16   Temp 03/13/24 1624 98.3 °F (36.8 °C)   Temp src 03/13/24 1624 Oral   SpO2 03/13/24 1622 93 %   O2 Device 03/13/24 1622 Nasal cannula       Current:/64 (BP Location: Right arm)   Pulse 87   Temp 97.6 °F (36.4 °C) (Axillary)   Resp 17   Ht 162.6 cm (5' 4\")   Wt 114.9 kg   LMP 05/22/2017 (Approximate)   SpO2 93%   BMI 43.50 kg/m²         Physical Exam    Neck and uncomfortable on arrival she is obese she sitting in an emergency  department bed she is awake alert and oriented her HEENT exam reveals pupils that are equal round and reactive to light.  Her posterior oropharynx reveals dry oral mucosa.  Upon arrival she is on 4 L nasal cannula.  She was changed to albuterol.  Initially diffusely diminished breath sounds, difficult to hear throughout, tachycardic, abdomen soft and nontender, extremities benign without significant edema.  No rashes noted, normal capillary refill.  Noted to be hypoxic on arrival with tachypneic and respiratory distress.    ED Course     Labs Reviewed   COMP METABOLIC PANEL (14) - Abnormal; Notable for the following components:       Result Value    Glucose 109 (*)     Sodium 132 (*)     Calculated Osmolality 273 (*)     Albumin 3.3 (*)     A/G Ratio 0.9 (*)     All other components within normal limits   POCT GLUCOSE - Abnormal; Notable for the following components:    POC Glucose 203 (*)     All other components within normal limits   CBC W/ DIFFERENTIAL - Abnormal; Notable for the following components:    Lymphocyte Absolute 0.57 (*)     All other components within normal limits   CBC WITH DIFFERENTIAL WITH PLATELET    Narrative:     The following orders were created for panel order CBC With Differential With Platelet.  Procedure                               Abnormality         Status                     ---------                               -----------         ------                     CBC W/ DIFFERENTIAL[[id number]]          Abnormal            Final result                 Please view results for these tests on the individual orders.   COMP METABOLIC PANEL (14)   MAGNESIUM   PHOSPHORUS   CBC WITH DIFFERENTIAL WITH PLATELET   PROTHROMBIN TIME (PT)   HEMOGLOBIN A1C   RAINBOW DRAW LAVENDER   RAINBOW DRAW LIGHT GREEN   RAINBOW DRAW BLUE     EKG    Rate, intervals and axes as noted on EKG Report.  Rate: 96  Rhythm: Sinus Rhythm  Readin bpm normal sinus rhythm no acute ST elevation or significant ST  depression normal intervals              ED Course as of 03/14/24 0344  ------------------------------------------------------------  Time: 03/13 1821  Comment: Spoke with dr nohemi schmidt hospitalist  Spoke with Dr Montague -   ------------------------------------------------------------  Time: 03/13 1824  Comment: Patient, still with scattered wheezes but starting to open up a little bit is likely going to need a second hour-long nebulization.  Magnesium going to infuse if she does not make significant gains at that point we will go ahead and use some terbutaline.  I do think she will need to stay overnight in the hospital as discussed with the hospitalist and pulmonology     Chest x-ray is independently interpreted by myself does not show lobar pneumonia or interstitial infiltrates.  Do suspect body habitus is mimicking some vascular overload but do not see that.  Patient is with a normal chest x-ray mild hyperinflation         MDM      58-year-old presents from [facility] care with concern for acute hypoxemic respiratory failure with a positive flu test and a history of asthma.  Differential diagnosis would include pneumothorax, pneumonia, asthma exacerbation, lung mass, patient immediately evaluated upon arrival, noted to have significantly diminished breath sounds, changed to albuterol continuous nebulization.  Patient steadily improving.  Steroids given, on reassessment I just really do not feel like she is improving as quickly as I would like, second albuterol started, magnesium given.  Discussed the case with pulmonology patient does have a pulmonologist.  Dr. Pedroza will consult.  Do suspect asthma exacerbation with flu.  Discussed this with the patient.  I do think she will need to stay overnight in the hospital given her sats were in the 80s upon arrival Tamiflu started as well.  Patient noted to be somewhat hyponatremia but IV fluids should correct that.  Admission disposition: 3/14/2024  3:44  AM           Total critical care time exclusive of procedure time with multiple reevaluations and critical decision making was 35 minutes                             Medical Decision Making      Disposition and Plan     Clinical Impression:  1. Influenza A    2. Severe persistent asthma with exacerbation (HCC)    3. Hypoxia    4. Hyponatremia         Disposition:  Admit  3/14/2024  3:44 am    Follow-up:  No follow-up provider specified.        Medications Prescribed:  Current Discharge Medication List                            Hospital Problems       Present on Admission  Date Reviewed: 3/13/2024            ICD-10-CM Noted POA    * (Principal) Influenza A J10.1 3/13/2024 Unknown    Exacerbation of asthma (HCC) J45.901 3/13/2024 Unknown    Hyperglycemia R73.9 3/13/2024 Yes    Hyponatremia E87.1 3/13/2024 Unknown    Hypoxia R09.02 3/13/2024 Unknown    Primary hypertension I10 3/13/2024 Unknown    Severe persistent asthma with exacerbation (HCC) J45.51 3/13/2024 Unknown    Type 2 diabetes mellitus without complication, without long-term current use of insulin ([facility]) E11.9 3/13/2024 Unknown

## 2024-03-13 NOTE — ED PROVIDER NOTES
Patient Seen in: [facility]      History     Chief Complaint   Patient presents with    Cough    Difficulty Breathing     Stated Complaint: sob    Subjective:   HPI    58-year-old female known history of obstructive sleep apnea recently started on CPAP this month, asthma diabetes who comes in today complaining of cough sore throat fevers chills and shortness of breath that began yesterday.  Patient ambulated in extremely tachypneic and hypoxic with oxygen saturations around 85% on room air.  Patient recently took a trip to [place] this past weekend with family no leg swelling or pain.  No history of DVT or PE.  Patient denies specific known sick contacts.  Family history includes mother with pulmonary embolism postsurgery.    Objective:   Past Medical History:   Diagnosis Date    Asthma (HCC)     CPAP (continuous positive airway pressure) dependence     Diabetes (HCC)     Pneumonia               Past Surgical History:   Procedure Laterality Date    NEEDLE BIOPSY LEFT      early 20's                No pertinent social history.            Review of Systems    Positive for stated complaint: sob  Other systems are as noted in HPI.  Constitutional and vital signs reviewed.      All other systems reviewed and negative except as noted above.    Physical Exam     ED Triage Vitals   BP 03/13/24 1453 120/71   Pulse 03/13/24 1453 111   Resp 03/13/24 1453 21   Temp 03/13/24 1453 (!) 101.7 °F (38.7 °C)   Temp src 03/13/24 1546 Temporal   SpO2 03/13/24 1453 (!) 85 %   O2 Device 03/13/24 1503 Nasal cannula       Current:/71   Pulse 104   Temp (!) 100.9 °F (38.3 °C) (Temporal)   Resp 21   Ht 162.6 cm (5' 4\")   Wt 117 kg   LMP 05/22/2017 (Approximate)   SpO2 96%   BMI 44.29 kg/m²         Physical Exam  Vitals and nursing note reviewed.   Constitutional:       General: She is in acute distress.      Appearance: She is well-developed. She is obese. She is not diaphoretic.   HENT:      Head:  Normocephalic and atraumatic.      Right Ear: External ear normal.      Left Ear: External ear normal.      Mouth/Throat:      Mouth: Mucous membranes are moist.      Pharynx: No oropharyngeal exudate.   Eyes:      Conjunctiva/sclera: Conjunctivae normal.      Pupils: Pupils are equal, round, and reactive to light.   Cardiovascular:      Rate and Rhythm: Regular rhythm. Tachycardia present.      Heart sounds: Normal heart sounds.   Pulmonary:      Effort: Pulmonary effort is normal. Tachypnea present. No respiratory distress.      Breath sounds: Normal breath sounds. No decreased breath sounds, wheezing, rhonchi or rales.   Musculoskeletal:         General: No tenderness. Normal range of motion.      Cervical back: Normal range of motion.   Lymphadenopathy:      Cervical: No cervical adenopathy.   Skin:     General: Skin is warm and dry.      Coloration: Skin is not pale.      Findings: No erythema or rash.   Neurological:      Mental Status: She is alert and oriented to person, place, and time.   Psychiatric:         Behavior: Behavior normal.         Thought Content: Thought content normal.         Judgment: Judgment normal.             ED Course     Labs Reviewed   POCT FLU TEST - Abnormal; Notable for the following components:       Result Value    POCT INFLUENZA A Positive (*)     All other components within normal limits    Narrative:     This assay is a rapid molecular in vitro test utilizing nucleic acid amplification of influenza A and B viral RNA.   RAPID SARS-COV-2 BY PCR - Normal          XR CHEST PA + LAT CHEST (CPT=71046)    Result Date: 3/13/2024  PROCEDURE:  XR CHEST PA + LAT CHEST (CPT=71046)  INDICATIONS:  sob  COMPARISON:  [facility] , XR, CHEST PA   LATERAL, 10/04/2010, 10:22 PM.  TECHNIQUE:  PA and lateral chest radiographs were obtained.  PATIENT STATED HISTORY: (As transcribed by Technologist)  Pt has asthma. Pt short of breath since yesterday.    FINDINGS:  The cardiomediastinal silhouette is  within normal limits.  Mild increased interstitial prominence likely represents mild vascular congestion.  There is no focal consolidation, effusion, or pneumothorax.  No aggressive osseous lesions are identified.            CONCLUSION:  Mild vascular congestion is noted.   LOCATION:  [facility]   Dictated by (CST): Deejay Guerrero MD on 3/13/2024 at 4:00 PM     Finalized by (CST): Deejay Guerrero MD on 3/13/2024 at 4:00 PM          MDM          Medical Decision Making  58-year-old female with upper respiratory symptoms fever and no shortness of breath that began in the past 24 hours.  Patient states that she feels very out of it and extremely short of breath she has been using her inhaler at home with no relief.    Patient came in febrile 101.7, tachycardic and hypoxic at 83 to 85% on room air patient is extremely dyspneic with tachypnea on initial physical exam.  Patient was initially placed on a nasal cannula even at 6 L patient was at approximately 89 to 91% and still feeling extremely uncomfortable.    COVID swab and influenza swab were taken patient was placed on nonrebreather mask    Discussed case with my supervising physician Dr. Wiggins given patient's history of asthma and significant hypoxia patient will require higher level of care patient to go by ambulance secondary to hypoxia to [facility].    Discussed case with charge nurse at ER they are aware of patient's arrival by ambulance.  Patient has antecubital IV in place    Tylenol 1 g was given patient was ReVital 100.9    Problems Addressed:  Hypoxia: acute illness or injury  Influenza A: acute illness or injury    Amount and/or Complexity of Data Reviewed  Labs: ordered. Decision-making details documented in ED Course.     Details: COVID-negative    Influenza A positive  Radiology: ordered and independent interpretation performed. Decision-making details documented in ED Course.     Details: Personally viewed the patient's chest x-ray no evidence of   consolidation  ECG/medicine tests: ordered and independent interpretation performed. Decision-making details documented in ED Course.     Details: Oxygen, Tylenol    Discussion of management or test interpretation with external provider(s): Discussed with Dr. Wiggins who agrees with my assessment and plan.    Risk  Decision regarding hospitalization.  Risk Details: Clinical Impression: Influenza A, hypoxia      The differential diagnosis before testing included COVID-19, influenza A, pulmonary embolism, pneumonia, asthma exacerbation with hypoxia, which is a medical condition that poses a threat to life/function.     When patient's vital signs upon initial arrival, I do believe she needs further evaluation and workup at a higher level of care patient will be transferred once with oxygen            Disposition and Plan     Clinical Impression:  1. Influenza A    2. Fever in other diseases    3. Hypoxia         Disposition:  Ic to ed  3/13/2024  3:18 pm    Follow-up:  [facility]  [address] [id number]  [phone]    If symptoms worsen          Medications Prescribed:  Discharge Medication List as of 3/13/2024  3:49 PM          This report has been produced using speech recognition software and may contain errors related to that system including, but not limited to, errors in grammar, punctuation, and spelling, as well as words and phrases that possibly may have been recognized inappropriately.  If there are any questions or concerns, contact the dictating provider for clarification.     NOTE: The 21st Century Cares Act makes medical notes available to patients.  Be advised that this is a medical document written in medical language and may contain abbreviations or verbiage that is unfamiliar or direct.  It is primarily intended to carry relevant historical information, physical exam findings, and the clinical assessment of the physician.

## 2024-03-13 NOTE — ED INITIAL ASSESSMENT (HPI)
PT ARRIVES VIA EMS FROM . PT ORIGINALLY WENT C/O SOB, COUGH, CHEST TIGHTNESS, AND WEAKNESS SINCE YESTERDAY. PT WAS FOUND TO BE HYPOXIC AT 85% ON RA AND FEBRILE .7. PT PLACED ON 5L O2 WITH SOME IMPROVEMENT. PT TESTED FLU A+ AT URGENT CARE.

## 2024-03-13 NOTE — ED QUICK NOTES
Orders for admission, patient is aware of plan and ready to go upstairs. Any questions, please call ED RN kris at extension [phone].     Patient Covid vaccination status: Fully vaccinated     COVID Test Ordered in ED: None    COVID Suspicion at Admission: N/A    Running Infusions:    sodium chloride 125 mL/hr at 03/13/24 0008        Mental Status/LOC at time of transport: A&Ox4    Other pertinent information: on 5L O2  CIWA score: N/A   NIH score:  N/A

## 2024-03-13 NOTE — ED INITIAL ASSESSMENT (HPI)
Cough / shortness of breath started yesterday. Last night took nyquil,advil 3 tans yesterday  and today at   9 am

## 2024-03-13 NOTE — [facility]P
[facility]     Kaylee Shaikh Patient Status:  Emergency    8/10/1965 MRN [id number]   Location [facility] EMERGENCY DEPARTMENT Attending Vidya Curtis MD   Hosp Day # 0 PCP None Pcp     Chief Complaint: SOB    History of Present Illness: Kaylee Shaikh is a 58 year old female with PMHx Asthma, CRISTIAN, DM2 who presents for SOB.    Patient notes that she recently returned from travel on Monday, since then started having symptoms onset yesterday, started having increased shortness of breath, wheezing, fatigue, malaise.  Notes that she normally does not get asthma exacerbations, only takes Singulair.  Denies any associated chest pain, abdominal pain, nausea, vomiting.  Currently getting hour-long neb treatment but still having ongoing wheezing and chest tightness.    Past Medical History:  Past Medical History:   Diagnosis Date    Asthma (HCC)     CPAP (continuous positive airway pressure) dependence     Diabetes (HCC)     Pneumonia         Past Surgical History:   Past Surgical History:   Procedure Laterality Date    NEEDLE BIOPSY LEFT      early        Social History:  reports that she has never smoked. She has never used smokeless tobacco.    Family History: History reviewed. No pertinent family history.    Allergies: No Known Allergies    Medications:    Current Facility-Administered Medications on File Prior to Encounter   Medication Dose Route Frequency Provider Last Rate Last Admin    [COMPLETED] acetaminophen (Tylenol Extra Strength) tab 1,000 mg  1,000 mg Oral Once Gladys Aguliar PA-C   1,000 mg at 24 1514     Current Outpatient Medications on File Prior to Encounter   Medication Sig Dispense Refill    losartan-hydroCHLOROthiazide 50-12.5 MG Oral Tab Take 1 tablet by mouth daily.      montelukast 10 MG Oral Tab Take 1 tablet (10 mg total) by mouth daily.      OZEMPIC, 0.25 OR 0.5 MG/DOSE, 2 MG/3ML Subcutaneous Solution Pen-injector INDICATIONS: TYPE 2 DIABETES  START WITH 0.25 MG PER WEEK FOR 4 WEEKS AND THEN 0.5 MG EVERY WEEK      buPROPion HCl 75 MG Oral Tab Take 1 tablet (75 mg total) by mouth.      FLUoxetine HCl 10 MG Oral Cap Take 10 mg by mouth.         Review of Systems:   A comprehensive 14 point review of systems was completed.    Pertinent positives and negatives noted in the HPI.    Physical Exam:    BP (!) 132/97   Pulse 94   Temp 98.3 °F (36.8 °C) (Oral)   Resp 18   Ht 5' 4\" (1.626 m)   Wt 258 lb (117 kg)   LMP 05/22/2017 (Approximate)   SpO2 98%   BMI 44.29 kg/m²   General: No acute distress. Alert and oriented x 3.  HEENT: Normocephalic atraumatic. Moist mucous membranes. EOM-I. PERRLA. Anicteric.  Neck: No lymphadenopathy. No JVD. No carotid bruits.  Respiratory: Diffuse expiratory wheezing on current neb treatment with good air movement  Cardiovascular: S1, S2. Regular rate and rhythm. No murmurs, rubs or gallops. Equal pulses.   Chest and Back: No tenderness or deformity.  Abdomen: Soft, nontender, nondistended.  Positive bowel sounds. No rebound, guarding or organomegaly.  Neurologic: No focal neurological deficits. CNII-XII grossly intact.  Musculoskeletal: Moves all extremities.  Extremities: No edema or cyanosis.  Integument: No rashes or lesions.   Psychiatric: Appropriate mood and affect.    Diagnostic Data:      Labs:  Recent Labs   Lab 03/13/24  1631   WBC 6.6   HGB 13.5   MCV 86.9   .0       Recent Labs   Lab 03/13/24  1631   *   BUN 9   CREATSERUM 0.83   CA 9.1   ALB 3.3*   *   K 3.8      CO2 25.0   ALKPHO 93   AST 18   ALT 23   BILT 0.3   TP 7.0       Estimated Creatinine Clearance: 63.8 mL/min (based on SCr of 0.83 mg/dL).    No results for input(s): \"PTP\", \"INR\" in the last 168 hours.    No results for input(s): \"TROP\", \"CK\" in the last 168 hours.    Imaging: Imaging data reviewed in [facility].  XR CHEST PA + LAT CHEST (CPT=71046)    Result Date: 3/13/2024  CONCLUSION:  Mild vascular congestion is noted.    LOCATION:  [facility]   Dictated by (CST): Deejay Guerrero MD on 3/13/2024 at 4:00 PM     Finalized by (CST): Deejay Guerrero MD on 3/13/2024 at 4:00 PM          ASSESSMENT / PLAN:     # Acute hypoxic respiratory failure  # Asthma exacerbation   - IV steroids   - Nebs Q4H   - Pulm on consult   - Wean O2 as tolerated    # Influenza infection   - Tamiflu    # Hyponatremia - IVF, trend chem  # DM2 - MDSSI + Accucheck QID (or Q6H if NPO)  # Obstructive Sleep Apnea - CPAP at night per protocol    # Depression - wellbutrin        Code Status: Not on file    Plan of care discussed with patient, ED physician    James Villalba MD  3/13/2024                Supplementary Documentation:      MDM : Patient's ER labs, imaging reviewed.  A.m. labs ordered.  ER management discussed with ED physician, decision made for patient to be admitted to the hospital for further medical management.

## 2024-03-14 LAB
ALBUMIN SERPL-MCNC: 3.1 G/DL (ref 3.4–5)
ALBUMIN/GLOB SERPL: 0.8 {RATIO} (ref 1–2)
ALP LIVER SERPL-CCNC: 92 U/L
ALT SERPL-CCNC: 25 U/L
ANION GAP SERPL CALC-SCNC: 5 MMOL/L (ref 0–18)
AST SERPL-CCNC: 22 U/L (ref 15–37)
ATRIAL RATE: 96 BPM
BASOPHILS # BLD AUTO: 0.02 X10(3) UL (ref 0–0.2)
BASOPHILS NFR BLD AUTO: 0.4 %
BILIRUB SERPL-MCNC: 0.2 MG/DL (ref 0.1–2)
BUN BLD-MCNC: 11 MG/DL (ref 9–23)
CALCIUM BLD-MCNC: 8.8 MG/DL (ref 8.5–10.1)
CHLORIDE SERPL-SCNC: 106 MMOL/L (ref 98–112)
CO2 SERPL-SCNC: 26 MMOL/L (ref 21–32)
CREAT BLD-MCNC: 0.56 MG/DL
EGFRCR SERPLBLD CKD-EPI 2021: 106 ML/MIN/1.73M2 (ref 60–?)
EOSINOPHIL # BLD AUTO: 0 X10(3) UL (ref 0–0.7)
EOSINOPHIL NFR BLD AUTO: 0 %
ERYTHROCYTE [DISTWIDTH] IN BLOOD BY AUTOMATED COUNT: 12.3 %
EST. AVERAGE GLUCOSE BLD GHB EST-MCNC: 148 MG/DL (ref 68–126)
GLOBULIN PLAS-MCNC: 4 G/DL (ref 2.8–4.4)
GLUCOSE BLD-MCNC: 153 MG/DL (ref 70–99)
GLUCOSE BLD-MCNC: 156 MG/DL (ref 70–99)
GLUCOSE BLD-MCNC: 156 MG/DL (ref 70–99)
GLUCOSE BLD-MCNC: 165 MG/DL (ref 70–99)
GLUCOSE BLD-MCNC: 181 MG/DL (ref 70–99)
HBA1C MFR BLD: 6.8 % (ref ?–5.7)
HCT VFR BLD AUTO: 40 %
HGB BLD-MCNC: 13.4 G/DL
IMM GRANULOCYTES # BLD AUTO: 0.02 X10(3) UL (ref 0–1)
IMM GRANULOCYTES NFR BLD: 0.4 %
INR BLD: 0.99 (ref 0.8–1.2)
LYMPHOCYTES # BLD AUTO: 0.72 X10(3) UL (ref 1–4)
LYMPHOCYTES NFR BLD AUTO: 13.3 %
MAGNESIUM SERPL-MCNC: 2.4 MG/DL (ref 1.6–2.6)
MCH RBC QN AUTO: 29.6 PG (ref 26–34)
MCHC RBC AUTO-ENTMCNC: 33.5 G/DL (ref 31–37)
MCV RBC AUTO: 88.3 FL
MONOCYTES # BLD AUTO: 0.15 X10(3) UL (ref 0.1–1)
MONOCYTES NFR BLD AUTO: 2.8 %
NEUTROPHILS # BLD AUTO: 4.5 X10 (3) UL (ref 1.5–7.7)
NEUTROPHILS # BLD AUTO: 4.5 X10(3) UL (ref 1.5–7.7)
NEUTROPHILS NFR BLD AUTO: 83.1 %
OSMOLALITY SERPL CALC.SUM OF ELEC: 287 MOSM/KG (ref 275–295)
P AXIS: 63 DEGREES
P-R INTERVAL: 158 MS
PHOSPHATE SERPL-MCNC: 3 MG/DL (ref 2.5–4.9)
PLATELET # BLD AUTO: 296 10(3)UL (ref 150–450)
POTASSIUM SERPL-SCNC: 3.9 MMOL/L (ref 3.5–5.1)
PROT SERPL-MCNC: 7.1 G/DL (ref 6.4–8.2)
PROTHROMBIN TIME: 13.1 SECONDS (ref 11.6–14.8)
Q-T INTERVAL: 342 MS
QRS DURATION: 94 MS
QTC CALCULATION (BEZET): 432 MS
R AXIS: 5 DEGREES
RBC # BLD AUTO: 4.53 X10(6)UL
SODIUM SERPL-SCNC: 137 MMOL/L (ref 136–145)
T AXIS: 63 DEGREES
VENTRICULAR RATE: 96 BPM
WBC # BLD AUTO: 5.4 X10(3) UL (ref 4–11)

## 2024-03-14 PROCEDURE — 99233 SBSQ HOSP IP/OBS HIGH 50: CPT | Performed by: HOSPITALIST

## 2024-03-14 RX ORDER — FLUTICASONE FUROATE AND VILANTEROL 100; 25 UG/1; UG/1
1 POWDER RESPIRATORY (INHALATION) DAILY
Status: DISCONTINUED | OUTPATIENT
Start: 2024-03-14 | End: 2024-03-15

## 2024-03-14 NOTE — PAYOR COMM NOTE
--------------  ADMISSION REVIEW   3/13-3/14  Payor: [employer]  Subscriber #:  [id number]  Authorization Number: [id number]    Admit date: 3/13/24  Admit time:  8:56 PM       REVIEW DOCUMENTATION:    ED Provider Notes signed by Vidya Curtis MD at 3/14/2024  3:44 AM    Patient Seen in: [facility] Emergency Department  History     Chief Complaint   Patient presents with    Difficulty Breathing     Stated Complaint: SOB, WEAKNESS, FLU A    Subjective:   HPI    Patient from [facility] immediate care history of asthma, obstructive sleep apnea, diabetes, pneumonia, and asthma.  Flulike symptoms for the past 2 to 3 days not difficulty breathing today    Objective:   Past Medical History:   Diagnosis Date    Asthma ([facility])     CPAP (continuous positive airway pressure) dependence     Diabetes ([facility])     Pneumonia    Positive for stated complaint: SOB, WEAKNESS, FLU A  Other systems are as noted in HPI.  Constitutional and vital signs reviewed.      All other systems reviewed and negative except as noted above.    Physical Exam     ED Triage Vitals   BP 03/13/24 1624 111/71   Pulse 03/13/24 1622 100   Resp 03/13/24 1622 16   Temp 03/13/24 1624 98.3 °F (36.8 °C)   Temp src 03/13/24 1624 Oral   SpO2 03/13/24 1622 93 %   O2 Device 03/13/24 1622 Nasal cannula       Current:/64 (BP Location: Right arm)   Pulse 87   Temp 97.6 °F (36.4 °C) (Axillary)   Resp 17   Ht 162.6 cm (5' 4\")   Wt 114.9 kg   LMP 05/22/2017 (Approximate)   SpO2 93%   BMI 43.50 kg/m²         Neck and uncomfortable on arrival she is obese she sitting in an emergency department bed she is awake alert and oriented her HEENT exam reveals pupils that are equal round and reactive to light.  Her posterior oropharynx reveals dry oral mucosa.  Upon arrival she is on 4 L nasal cannula.  She was changed to albuterol.  Initially diffusely diminished breath sounds, difficult to hear throughout, tachycardic, abdomen soft and  nontender, extremities benign without significant edema.  No rashes noted, normal capillary refill.  Noted to be hypoxic on arrival with tachypneic and respiratory distress.    ED Course     Labs Reviewed   COMP METABOLIC PANEL (14) - Abnormal; Notable for the following components:       Result Value    Glucose 109 (*)     Sodium 132 (*)     Calculated Osmolality 273 (*)     Albumin 3.3 (*)     A/G Ratio 0.9 (*)     All other components within normal limits   POCT GLUCOSE - Abnormal; Notable for the following components:    POC Glucose 203 (*)     All other components within normal limits   CBC W/ DIFFERENTIAL - Abnormal; Notable for the following components:    Lymphocyte Absolute 0.57 (*)     All other components within normal limits   CBC WITH DIFFERENTIAL WITH PLATELET    Narrative:     The following orders were created for panel order CBC With Differential With Platelet.  Procedure                               Abnormality         Status                     ---------                               -----------         ------                     CBC W/ DIFFERENTIAL[[id number]]          Abnormal            Final result                 Please view results for these tests on the individual orders.   COMP METABOLIC PANEL (14)   MAGNESIUM   PHOSPHORUS   CBC WITH DIFFERENTIAL WITH PLATELET   PROTHROMBIN TIME (PT)   HEMOGLOBIN A1C   RAINBOW DRAW LAVENDER   RAINBOW DRAW LIGHT GREEN   RAINBOW DRAW BLUE   EKG    Rate, intervals and axes as noted on EKG Report.  Rate: 96  Rhythm: Sinus Rhythm  Readin bpm normal sinus rhythm no acute ST elevation or significant ST depression normal intervals    ED Course as of 24 0344  ------------------------------------------------------------  Time: 1821  Comment: Spoke with dr nohemi schmidt hospitalist  Spoke with Dr Montague -   ------------------------------------------------------------  Time: 1824  Comment: Patient, still with scattered wheezes but starting to open  up a little bit is likely going to need a second hour-long nebulization.  Magnesium going to infuse if she does not make significant gains at that point we will go ahead and use some terbutaline.  I do think she will need to stay overnight in the hospital as discussed with the hospitalist and pulmonology     Chest x-ray is independently interpreted by myself does not show lobar pneumonia or interstitial infiltrates.  Do suspect body habitus is mimicking some vascular overload but do not see that.  Patient is with a normal chest x-ray mild hyperinflation      58-year-old presents from [facility] care with concern for acute hypoxemic respiratory failure with a positive flu test and a history of asthma.  Differential diagnosis would include pneumothorax, pneumonia, asthma exacerbation, lung mass, patient immediately evaluated upon arrival, noted to have significantly diminished breath sounds, changed to albuterol continuous nebulization.  Patient steadily improving.  Steroids given, on reassessment I just really do not feel like she is improving as quickly as I would like, second albuterol started, magnesium given.  Discussed the case with pulmonology patient does have a pulmonologist.  Dr. Pedroza will consult.  Do suspect asthma exacerbation with flu.  Discussed this with the patient.  I do think she will need to stay overnight in the hospital given her sats were in the 80s upon arrival Tamiflu started as well.  Patient noted to be somewhat hyponatremia but IV fluids should correct that.    Admission disposition: 3/14/2024  3:44 AM    Total critical care time exclusive of procedure time with multiple reevaluations and critical decision making was 35 minutes    Disposition and Plan     Clinical Impression:  1. Influenza A    2. Severe persistent asthma with exacerbation (HCC)    3. Hypoxia    4. Hyponatremia       Disposition:  Admit  3/14/2024  3:44 am    Hospital Problems       Present on Admission  Date  Reviewed: 3/13/2024            ICD-10-CM Noted POA    * (Principal) Influenza A J10.1 3/13/2024 Unknown    Exacerbation of asthma (HCC) J45.901 3/13/2024 Unknown    Hyperglycemia R73.9 3/13/2024 Yes    Hyponatremia E87.1 3/13/2024 Unknown    Hypoxia R09.02 3/13/2024 Unknown    Primary hypertension I10 3/13/2024 Unknown    Severe persistent asthma with exacerbation ([facility]) J45.51 3/13/2024 Unknown    Type 2 diabetes mellitus without complication, without long-term current use of insulin ([facility]) E11.9 3/13/2024 Unknown                 3/13 H&P    Chief Complaint: SOB     History of Present Illness: Kaylee Shaikh is a 58 year old female with PMHx Asthma, CRISTIAN, DM2 who presents for SOB.     Patient notes that she recently returned from travel on Monday, since then started having symptoms onset yesterday, started having increased shortness of breath, wheezing, fatigue, malaise.  Notes that she normally does not get asthma exacerbations, only takes Singulair.  Denies any associated chest pain, abdominal pain, nausea, vomiting.  Currently getting hour-long neb treatment but still having ongoing wheezing and chest tightness.    BP (!) 132/97   Pulse 94   Temp 98.3 °F (36.8 °C) (Oral)   Resp 18   Ht 5' 4\" (1.626 m)   Wt 258 lb (117 kg)   LMP 05/22/2017 (Approximate)   SpO2 98%   BMI 44.29 kg/m²   General: No acute distress. Alert and oriented x 3.  HEENT: Normocephalic atraumatic. Moist mucous membranes. EOM-I. PERRLA. Anicteric.  Neck: No lymphadenopathy. No JVD. No carotid bruits.  Respiratory: Diffuse expiratory wheezing on current neb treatment with good air movement    # Acute hypoxic respiratory failure  # Asthma exacerbation   - IV steroids   - Nebs Q4H   - Pulm on consult   - Wean O2 as tolerated     # Influenza infection   - Tamiflu     # Hyponatremia - IVF, trend chem  # DM2 - MDSSI + Accucheck QID (or Q6H if NPO)  # Obstructive Sleep Apnea - CPAP at night per protocol    # Depression - wellbutrin              3/14  Temp:  [97.6 °F (36.4 °C)-101.7 °F (38.7 °C)] 97.6 °F (36.4 °C)  Pulse:  [] 87  Resp:  [13-21] 17  BP: (110-143)/(64-97) 122/64  SpO2:  [85 %-98 %] 93 %     Lab 03/13/24  1631 03/14/24  0725   WBC 6.6  --    HGB 13.5  --    MCV 86.9  --    .0  --    INR  --  0.99     Medications:    oseltamivir  75 mg Oral Q12H    albuterol  2.5 mg Nebulization Q4H WA (5 times daily)    methylPREDNISolone  40 mg Intravenous Q8H    losartan (Cozaar) 50 mg, hydroCHLOROthiazide (HYDRODIURIL) 12.5 mg for Hyzaar 50/12.5 ([facility] only)   Oral Daily    montelukast  10 mg Oral Daily    enoxaparin  0.5 mg/kg Subcutaneous Daily    insulin aspart  1-10 Units Subcutaneous TID AC and HS    buPROPion  150 mg Oral Daily               Assessment & Plan:   #Acute hypoxemic respiratory failure  #Acute asthma exacerbation  #Flu A positive  -On room air at baseline, remains on 3 L nasal cannula, wean as tolerated  -MAGALI prn & scheduled, iv solumedrol   -tamiflu (3/13 x5days)  -Pulmonary to see     #Hyponatremia  -IV fluids  -Labs pending     #Diabetes mellitus type 2  -A1c pending  -Insulin sliding scale for now     #Essential hypertension  -PTA: Losartan-hydrochlorothiazide     #CRISTIAN     #Depression        #Morbid obesity  -BMI 43.50           MEDICATIONS ADMINISTERED IN LAST 1 DAY:  acetaminophen (Tylenol Extra Strength) tab 1,000 mg       Date Action Dose Route User    Admitted on 3/13/2024    Discharged on 3/13/2024    3/13/2024 1514 Given 1,000 mg Oral Faith Casiano RN          albuterol (Ventolin) (2.5 MG/3ML) 0.083% nebulizer solution 2.5 mg       Date Action Dose Route User    3/14/2024 1210 Given 2.5 mg Nebulization Mari Cortés RCP    3/14/2024 0819 Given 2.5 mg Nebulization Mari Cortés RCP    3/13/2024 2235 Given 2.5 mg Nebulization Parish Box RCP          albuterol (Ventolin) (5 MG/ML) 0.5% nebulizer solution 10 mg       Date Action Dose Route User    3/13/2024 1653 Given 10 mg Nebulization  Ced Peña RCP          albuterol (Ventolin) (5 MG/ML) 0.5% nebulizer solution 10 mg       Date Action Dose Route User    3/13/2024 1835 Given 10 mg Nebulization Ced Peña RCP          buPROPion (Wellbutrin) tab 150 mg       Date Action Dose Route User    3/14/2024 0851 Given 150 mg Oral Larissa Velasquez RN          losartan (Cozaar) 50 mg, hydroCHLOROthiazide (HYDRODIURIL) 12.5 mg for Hyzaar 50/12.5 ([facility] only)       Date Action Dose Route User    3/14/2024 0851 Given (none) Oral Larissa Velasquez RN    3/13/2024 2152 Given (none) Oral Kerline Hamilton RN          enoxaparin (Lovenox) 60 MG/0.6ML SUBQ injection 60 mg       Date Action Dose Route User    3/14/2024 0854 Given 60 mg Subcutaneous (Left Lower Abdomen) Larissa Velasquez RN          insulin aspart (NovoLOG) 100 Units/mL FlexPen 1-10 Units       Date Action Dose Route User    3/13/2024 2152 Given 2 Units Subcutaneous (Left Upper Arm) Kerline Hamilton RN          ipratropium (Atrovent) 0.02 % nebulizer solution 1 mg       Date Action Dose Route User    3/13/2024 1653 Given 1 mg Nebulization Ced Peña RCP          lactated ringers infusion       Date Action Dose Route User    3/13/2024 2120 New Bag (none) Intravenous Kerline Hamilton RN          magnesium sulfate in sterile water for injection 2 g/50mL IVPB premix 2 g       Date Action Dose Route User    3/13/2024 1805 New Bag 2 g Intravenous Neptali Birmingham RN          methylPREDNISolone sodium succinate (Solu-MEDROL) injection 125 mg       Date Action Dose Route User    3/13/2024 1641 Given 125 mg Intravenous Neptali Birmingham RN          methylPREDNISolone sodium succinate (Solu-MEDROL) injection 40 mg       Date Action Dose Route User    3/14/2024 0851 Given 40 mg Intravenous Larissa Velasquez RN    3/14/2024 0004 Given 40 mg Intravenous Kerline Hamilton RN          montelukast (Singulair) tab 10 mg       Date Action Dose Route User    3/14/2024 0851 Given 10 mg Oral Larissa Velasquez RN    3/13/2024  2152 Given 10 mg Oral Kerline Hamilton RN          oseltamivir (Tamiflu) cap 75 mg       Date Action Dose Route User    3/13/2024 1942 Given 75 mg Oral Neptali Birmingham RN          oseltamivir (Tamiflu) cap 75 mg       Date Action Dose Route User    3/14/2024 0852 Given 75 mg Oral Larissa Velasquez RN          sodium chloride 0.9% infusion       Date Action Dose Route User    3/13/2024 1738 New Bag (none) Intravenous Neptali Birmingham RN          sodium chloride 0.9 % IV bolus 500 mL       Date Action Dose Route User    3/13/2024 1641 New Bag 500 mL Intravenous Neptali Birmingham RN            Vitals (last day)       Date/Time Temp Pulse Resp BP SpO2 Weight O2 Device O2 Flow Rate (L/min) [facility]    03/14/24 1210 -- 82 13 -- 97 % -- Nasal cannula 4 L/min EO    03/14/24 1122 98.7 °F (37.1 °C) -- 18 128/77 -- -- -- -- WB    03/14/24 0924 98.5 °F (36.9 °C) -- 18 117/78 -- -- -- -- WB    03/14/24 0819 -- 83 23 -- 97 % -- Nasal cannula -- EO    03/13/24 2358 -- 87 17 -- 93 % -- -- -- LP    03/13/24 2333 97.6 °F (36.4 °C) 91 18 122/64 90 % -- CPAP -- AN    03/13/24 2121 98.5 °F (36.9 °C) 97 18 110/78 94 % 253 lb 6.4 oz Nasal cannula 5 L/min AN    03/13/24 2000 -- 102 13 -- 97 % -- -- -- DH    03/13/24 1900 -- 98 17 143/70 98 % -- Nasal cannula -- DH    03/13/24 1745 -- 94 18 132/97 98 % -- Nasal cannula 5 L/min DH    03/13/24 1630 -- -- -- -- -- -- Nasal cannula 5 L/min DH    03/13/24 1624 98.3 °F (36.8 °C) -- -- 111/71 -- -- -- -- DH    03/13/24 1622 -- 100 16 -- 93 % 258 lb Nasal cannula 5 L/min DH

## 2024-03-14 NOTE — PLAN OF CARE
Pt alert and oriented x4. Baseline RA, currently on 2L. IV steroids, scheduled nebs. Tamiflu. Tele, SR. Lovenox. Voids. Qid accucheck. Saline-locked. Pt and spouse updated on poc. Call light in reach. Safety precautions in place. All needs are met at this time.    Problem: Diabetes/Glucose Control  Goal: Glucose maintained within prescribed range  Description: INTERVENTIONS:  - Monitor Blood Glucose as ordered  - Assess for signs and symptoms of hyperglycemia and hypoglycemia  - Administer ordered medications to maintain glucose within target range  - Assess barriers to adequate nutritional intake and initiate nutrition consult as needed  - Instruct patient on self management of diabetes  Outcome: Progressing     Problem: Patient/Family Goals  Goal: Patient/Family Long Term Goal  Description: Patient's Long Term Goal:   Discharge to home    Interventions:  - collaborative care  - See additional Care Plan goals for specific interventions  Outcome: Progressing  Goal: Patient/Family Short Term Goal  Description: Patient's Short Term Goal:   3/14: wean O2 as tolerated    Interventions:   - supportive care  - See additional Care Plan goals for specific interventions  Outcome: Progressing

## 2024-03-14 NOTE — PROGRESS NOTES
[facility]   part of [facility]     Hospitalist Progress Note     Kaylee Shaikh Patient Status:  Inpatient    8/10/1965 MRN [id number]   Location [facility] 5NW-A Attending Parvez Gallego,    Hosp Day # 1 PCP None Pcp     Chief Complaint: Shortness of breath     Subjective:     Patient states shortness of breath is improved.  No chest pain.    Objective:    Review of Systems:   A comprehensive review of systems was completed; pertinent positive and negatives stated in subjective.    Vital signs:  Temp:  [97.6 °F (36.4 °C)-101.7 °F (38.7 °C)] 97.6 °F (36.4 °C)  Pulse:  [] 87  Resp:  [13-21] 17  BP: (110-143)/(64-97) 122/64  SpO2:  [85 %-98 %] 93 %    Physical Exam:    General: No acute distress  Respiratory: No wheezes, no rhonchi  Cardiovascular: S1, S2, regular rate and rhythm  Abdomen: Soft, Non-tender, non-distended, positive bowel sounds  Neuro: No new focal deficits.   Extremities: No edema      Diagnostic Data:    Labs:  Recent Labs   Lab 24  1631 24  0725   WBC 6.6  --    HGB 13.5  --    MCV 86.9  --    .0  --    INR  --  0.99       Recent Labs   Lab 24  1631   *   BUN 9   CREATSERUM 0.83   CA 9.1   ALB 3.3*   *   K 3.8      CO2 25.0   ALKPHO 93   AST 18   ALT 23   BILT 0.3   TP 7.0       Estimated Creatinine Clearance: 63.8 mL/min (based on SCr of 0.83 mg/dL).    No results for input(s): \"TROP\", \"TROPHS\", \"CK\" in the last 168 hours.    Recent Labs   Lab 24  0725   PTP 13.1   INR 0.99                  Microbiology    No results found for this visit on 24.      Imaging: Reviewed in Epic.    Medications:    oseltamivir  75 mg Oral Q12H    albuterol  2.5 mg Nebulization Q4H WA (5 times daily)    methylPREDNISolone  40 mg Intravenous Q8H    losartan (Cozaar) 50 mg, hydroCHLOROthiazide (HYDRODIURIL) 12.5 mg for Hyzaar 50/12.5 ([facility] only)   Oral Daily    montelukast  10 mg Oral Daily    enoxaparin  0.5 mg/kg Subcutaneous Daily    insulin  aspart  1-10 Units Subcutaneous TID AC and HS    buPROPion  150 mg Oral Daily       Assessment & Plan:      #Acute hypoxemic respiratory failure  #Acute asthma exacerbation  #Flu A positive  -On room air at baseline, remains on 3 L nasal cannula, wean as tolerated  -MAGALI prn & scheduled, iv solumedrol   -tamiflu (3/13 x5days)  -Pulmonary to see    #Hyponatremia  -IV fluids  -Labs pending    #Diabetes mellitus type 2  -A1c pending  -Insulin sliding scale for now    #Essential hypertension  -PTA: Losartan-hydrochlorothiazide    #CRISTIAN    #Depression      #Morbid obesity  -BMI 43.50    Parvez Gallego DO    Supplementary Documentation:     Quality:  DVT Mechanical Prophylaxis:   SCDs, Early ambuation  DVT Pharmacologic Prophylaxis   Medication    enoxaparin (Lovenox) 60 MG/0.6ML SUBQ injection 60 mg                Code Status: Not on file  Remy: No urinary catheter in place  Remy Duration (in days):   Central line:    OMAIRA:     Discharge is dependent on: Improvement in respiratory status  At this point Ms. Shaikh is expected to be discharge to: TBD    The 21st Century Cures Act makes medical notes like these available to patients in the interest of transparency. Please be advised this is a medical document. Medical documents are intended to carry relevant information, facts as evident, and the clinical opinion of the practitioner. The medical note is intended as peer to peer communication and may appear blunt or direct. It is written in medical language and may contain abbreviations or verbiage that are unfamiliar.

## 2024-03-14 NOTE — PROGRESS NOTES
NURSING ADMISSION NOTE      Patient admitted via Cart  Oriented to room. 519  Safety precautions initiated.  Bed in low position.  Call light in reach.      Pt Aox4. VSS,afebrile. Spo2 >90% on 5L, cpap noc. IV solumedrol, nebs. NSR on tele, EP, lovenox. Continent x2. Qid accucheck. IVF, PO tamiflu. Pt updated on poc. Call light within reach, safety precautions in place. No further pt needs at this time.

## 2024-03-14 NOTE — CONSULTS
[facility]    Kaylee Shaikh Patient Status:  Inpatient    8/10/1965 MRN [id number]   Location [facility] 5NW-A Attending Parvez Gallego DO   Hosp Day # 1 PCP None Pcp     Date of Admission: 3/13/2024  4:20 PM  Admission Diagnosis: Hyponatremia [E87.1]  Influenza A [J10.1]  Hypoxia [R09.02]  Severe persistent asthma with exacerbation (HCC) [J45.51]  Reason for Consult: asthma exacerbation      History of Present Illness:  57 yo female with history of DM, asthma, CRISTIAN on cpap who presented on 3/13 with complaints of increased SOB, fatigue, and wheezing x 1 day.  Pt states that symptoms came on quickly and within 24 hours she had marked SOB.  No improvement with albuterol.  No associated chest pain, fevers, chills.  In the ER pt was wheezing.  Pt was started on steroids and nebs and admitted for further care.      Past Medical History:   Diagnosis Date    Asthma (HCC)     CPAP (continuous positive airway pressure) dependence     Diabetes (HCC)     Pneumonia       Past Surgical History:   Procedure Laterality Date    NEEDLE BIOPSY LEFT      early s      No Known Allergies     Social History:   reports that she has never smoked. She has never used smokeless tobacco.     Family History:  History reviewed. No pertinent family history.      Home Medications:  Outpatient Medications Marked as Taking for the 3/13/24 encounter (Hospital Encounter)   Medication Sig Dispense Refill    losartan-hydroCHLOROthiazide 50-12.5 MG Oral Tab Take 1 tablet by mouth daily.      montelukast 10 MG Oral Tab Take 1 tablet (10 mg total) by mouth daily.      OZEMPIC, 0.25 OR 0.5 MG/DOSE, 2 MG/3ML Subcutaneous Solution Pen-injector INDICATIONS: TYPE 2 DIABETES START WITH 0.25 MG PER WEEK FOR 4 WEEKS AND THEN 0.5 MG EVERY WEEK      buPROPion HCl 75 MG Oral Tab Take 2 tablets (150 mg total) by mouth daily.          Current Medications:    Current Facility-Administered Medications:     [COMPLETED] sodium chloride 0.9 % IV bolus 500 mL,  500 mL, Intravenous, Once **FOLLOWED BY** sodium chloride 0.9% infusion, , Intravenous, Continuous    oseltamivir (Tamiflu) cap 75 mg, 75 mg, Oral, Q12H    albuterol (Ventolin) (2.5 MG/3ML) 0.083% nebulizer solution 2.5 mg, 2.5 mg, Nebulization, Q4H WA (5 times daily)    methylPREDNISolone sodium succinate (Solu-MEDROL) injection 40 mg, 40 mg, Intravenous, Q8H    losartan (Cozaar) 50 mg, hydroCHLOROthiazide (HYDRODIURIL) 12.5 mg for Hyzaar 50/12.5 (EEH only), , Oral, Daily    montelukast (Singulair) tab 10 mg, 10 mg, Oral, Daily    acetaminophen (Tylenol Extra Strength) tab 1,000 mg, 1,000 mg, Oral, Q8H PRN    melatonin tab 3 mg, 3 mg, Oral, Nightly PRN    polyethylene glycol (PEG 3350) (Miralax) 17 g oral packet 17 g, 17 g, Oral, Daily PRN    sennosides (Senokot) tab 17.2 mg, 17.2 mg, Oral, Nightly PRN    bisacodyl (Dulcolax) 10 MG rectal suppository 10 mg, 10 mg, Rectal, Daily PRN    ondansetron (Zofran) 4 MG/2ML injection 4 mg, 4 mg, Intravenous, Q6H PRN    prochlorperazine (Compazine) 10 MG/2ML injection 5 mg, 5 mg, Intravenous, Q8H PRN    benzonatate (Tessalon) cap 200 mg, 200 mg, Oral, TID PRN    guaiFENesin (Robitussin) 100 MG/5 ML oral liquid 200 mg, 200 mg, Oral, Q4H PRN    glycerin-hypromellose- (Artifical Tears) 0.2-0.2-1 % ophthalmic solution 1 drop, 1 drop, Both Eyes, QID PRN    sodium chloride (Saline Mist) 0.65 % nasal solution 1 spray, 1 spray, Each Nare, Q3H PRN    enoxaparin (Lovenox) 60 MG/0.6ML SUBQ injection 60 mg, 0.5 mg/kg, Subcutaneous, Daily    ibuprofen (Motrin) tab 200 mg, 200 mg, Oral, Q4H PRN **OR** ibuprofen (Motrin) tab 400 mg, 400 mg, Oral, Q4H PRN **OR** ibuprofen (Motrin) tab 600 mg, 600 mg, Oral, Q4H PRN    glucose (Dex4) 15 GM/59ML oral liquid 15 g, 15 g, Oral, Q15 Min PRN **OR** glucose (Glutose) 40% oral gel 15 g, 15 g, Oral, Q15 Min PRN **OR** glucose-vitamin C (Dex-4) chewable tab 4 tablet, 4 tablet, Oral, Q15 Min PRN **OR** dextrose 50% injection 50 mL, 50 mL,  Intravenous, Q15 Min PRN **OR** glucose (Dex4) 15 GM/59ML oral liquid 30 g, 30 g, Oral, Q15 Min PRN **OR** glucose (Glutose) 40% oral gel 30 g, 30 g, Oral, Q15 Min PRN **OR** glucose-vitamin C (Dex-4) chewable tab 8 tablet, 8 tablet, Oral, Q15 Min PRN    insulin aspart (NovoLOG) 100 Units/mL FlexPen 1-10 Units, 1-10 Units, Subcutaneous, TID AC and HS    buPROPion (Wellbutrin) tab 150 mg, 150 mg, Oral, Daily     Review of Systems:  Constitutional: denies weight loss, fevers, chills, night sweats, or fatigue  HEENT: denies vision or hearing changes, eye pain, tinnitus, hearing loss, sore throat, epistaxis, sinus congestion  Cardiovascular: denies chest pain, PND, palpitations, edema, orthopnea, or syncope  Respiratory: +SOB, dyspnea on exertion, cough, wheezing  GI: denies nausea, vomiting, diarrhea, constipation, melena, abdominal pain  : denies hematuria, dysuria, hesitancy, or incontinence  Musculoskeletal: denies arthralgias, myalgias, muscle weakness, or joint swelling  Skin: denies rash or pruritis; no jaundice  Neurologic: denies numbness, weakness, ataxia, tremors, or vertigo  Psychiatric: denies insomnia, depression, anxiety, or drug abuse  Endocrine: denies polydypsia, polyuria, cold/heat intolerance  Hematologic/lymphatic: denies easy bruising, new blood clots, or lymphedema  Allergic/immunologic: denies hay fever, hives, or new allergies     OBJECTIVE:  Patient Vitals for the past 24 hrs:   BP Temp Temp src Pulse Resp SpO2 Height Weight   03/14/24 1210 -- -- -- 82 13 97 % -- --   03/14/24 1122 128/77 98.7 °F (37.1 °C) Oral -- 18 -- -- --   03/14/24 0924 117/78 98.5 °F (36.9 °C) Oral -- 18 -- -- --   03/14/24 0819 -- -- -- 83 23 97 % -- --   03/13/24 2358 -- -- -- 87 17 93 % -- --   03/13/24 2333 122/64 97.6 °F (36.4 °C) Axillary 91 18 90 % -- --   03/13/24 2121 110/78 98.5 °F (36.9 °C) Oral 97 18 94 % -- 253 lb 6.4 oz (114.9 kg)   03/13/24 2000 -- -- -- 102 13 97 % -- --   03/13/24 1900 143/70 -- -- 98  17 98 % -- --   03/13/24 1745 (!) 132/97 -- -- 94 18 98 % -- --   03/13/24 1624 111/71 98.3 °F (36.8 °C) Oral -- -- -- -- --   03/13/24 1622 -- -- -- 100 16 93 % 5' 4\" (1.626 m) 258 lb (117 kg)     O2 requirement: 4L  Wt Readings from Last 3 Encounters:   03/13/24 253 lb 6.4 oz (114.9 kg)   03/13/24 258 lb (117 kg)   02/20/20 227 lb 3.2 oz (103.1 kg)        I/O last 3 completed shifts:  In: 1550 [I.V.:1000; IV PIGGYBACK:550]  Out: 950 [Urine:950]  I/O this shift:  In: 240 [P.O.:240]  Out: -      Physical Exam:                          General: alert, cooperative, in NAD                          HEENT: oropharynx clear without erythema or exudates, moist mucous membranes                          Lungs: expiratory wheeze                          Chest wall: No tenderness or deformity.                          Heart: Regular rate and rhythm, normal S1S2                          Abdomen: soft, non-tender, non-distended, positive BS.                          Extremity: No clubbing or cyanosis. no edema                          Skin: No rashes or lesions.       Lab Results   Component Value Date    WBC 5.4 03/14/2024    RBC 4.53 03/14/2024    HGB 13.4 03/14/2024    HCT 40.0 03/14/2024    MCV 88.3 03/14/2024    MCH 29.6 03/14/2024    MCHC 33.5 03/14/2024    RDW 12.3 03/14/2024    .0 03/14/2024     Lab Results   Component Value Date     03/14/2024    K 3.9 03/14/2024     03/14/2024    CO2 26.0 03/14/2024    BUN 11 03/14/2024    CREATSERUM 0.56 03/14/2024     03/14/2024    CA 8.8 03/14/2024     Lab Results   Component Value Date     03/14/2024    K 3.9 03/14/2024     03/14/2024    CO2 26.0 03/14/2024    BUN 11 03/14/2024    CREATSERUM 0.56 03/14/2024     03/14/2024    CA 8.8 03/14/2024    ALKPHO 92 03/14/2024    ALT 25 03/14/2024    AST 22 03/14/2024    BILT 0.2 03/14/2024    ALB 3.1 03/14/2024    TP 7.1 03/14/2024     Lab Results   Component Value Date    INR 0.99 03/14/2024           Imaging: I have independently visualized all relevant chest imaging in PACS.  I agree with the radiology interpretation except where noted.       ASSESSMENT/PLAN:  Acute hypoxic respiratory failure: 2/2 acute asthma exacerbation in setting of influenza  -CXR with increased interstitial infiltrates concerning for viral PNA vs pulmonary edema  -wean O2 as able  Asthma: with acute exacerbation  -IV steroids  -start breo 100  -singulair  -BD protocol  Influenza:  -tamiflu  CRISTIAN:  -cpap protocol    Gladys Salamanca MD  3/14/2024  3:26 PM

## 2024-03-15 VITALS
BODY MASS INDEX: 43.26 KG/M2 | WEIGHT: 253.38 LBS | OXYGEN SATURATION: 92 % | HEART RATE: 65 BPM | TEMPERATURE: 98 F | HEIGHT: 64 IN | SYSTOLIC BLOOD PRESSURE: 145 MMHG | RESPIRATION RATE: 15 BRPM | DIASTOLIC BLOOD PRESSURE: 86 MMHG

## 2024-03-15 LAB
GLUCOSE BLD-MCNC: 116 MG/DL (ref 70–99)
GLUCOSE BLD-MCNC: 121 MG/DL (ref 70–99)
GLUCOSE BLD-MCNC: 143 MG/DL (ref 70–99)

## 2024-03-15 PROCEDURE — 99239 HOSP IP/OBS DSCHRG MGMT >30: CPT | Performed by: HOSPITALIST

## 2024-03-15 RX ORDER — ALBUTEROL SULFATE 2.5 MG/3ML
2.5 SOLUTION RESPIRATORY (INHALATION)
Status: DISCONTINUED | OUTPATIENT
Start: 2024-03-15 | End: 2024-03-15

## 2024-03-15 RX ORDER — FLUTICASONE FUROATE AND VILANTEROL 100; 25 UG/1; UG/1
1 POWDER RESPIRATORY (INHALATION) DAILY
Qty: 1 EACH | Refills: 0 | Status: SHIPPED | OUTPATIENT
Start: 2024-03-16

## 2024-03-15 RX ORDER — ALBUTEROL SULFATE 2.5 MG/3ML
2.5 SOLUTION RESPIRATORY (INHALATION) EVERY 6 HOURS PRN
Status: DISCONTINUED | OUTPATIENT
Start: 2024-03-15 | End: 2024-03-15

## 2024-03-15 RX ORDER — OSELTAMIVIR PHOSPHATE 75 MG/1
75 CAPSULE ORAL EVERY 12 HOURS SCHEDULED
Qty: 6 CAPSULE | Refills: 0 | Status: SHIPPED | OUTPATIENT
Start: 2024-03-15 | End: 2024-03-18

## 2024-03-15 RX ORDER — PREDNISONE 20 MG/1
TABLET ORAL
Qty: 15 TABLET | Refills: 0 | Status: SHIPPED | OUTPATIENT
Start: 2024-03-15

## 2024-03-15 NOTE — PLAN OF CARE
Pt Aox4. VSS,afebrile. Spo2 >90% on 2-4L, cpap noc. IV solumedrol, nebs. NSR on tele, EP, lovenox. Continent x2. QID accucheck. PO tamiflu. Pt updated on poc. Call light within reach, safety precautions in place. No further pt needs at this time.     Problem: Diabetes/Glucose Control  Goal: Glucose maintained within prescribed range  Description: INTERVENTIONS:  - Monitor Blood Glucose as ordered  - Assess for signs and symptoms of hyperglycemia and hypoglycemia  - Administer ordered medications to maintain glucose within target range  - Assess barriers to adequate nutritional intake and initiate nutrition consult as needed  - Instruct patient on self management of diabetes  Outcome: Progressing     Problem: Patient/Family Goals  Goal: Patient/Family Long Term Goal  Description: Patient's Long Term Goal:   Discharge to home    Interventions:  - collaborative care  - See additional Care Plan goals for specific interventions  Outcome: Progressing  Goal: Patient/Family Short Term Goal  Description: Patient's Short Term Goal:   3/14: wean O2 as tolerated    Interventions:   - supportive care  - See additional Care Plan goals for specific interventions  Outcome: Progressing

## 2024-03-15 NOTE — PROGRESS NOTES
[facility]    Kaylee Shaikh Patient Status:  Inpatient    8/10/1965 MRN [id number]   Location [facility] 5NW-A Attending Parvez Gallego,    Hosp Day # 2 PCP None Pcp     SUBJECTIVE: Pt states that she feels well, wants to go home.     OBJECTIVE:  /77 (BP Location: Right arm)   Pulse 72   Temp 97.8 °F (36.6 °C) (Oral)   Resp 13   Ht 5' 4\" (1.626 m)   Wt 253 lb 6.4 oz (114.9 kg)   LMP 2017 (Approximate)   SpO2 (!) 89%   BMI 43.50 kg/m²   O2 requirement: 2L     I/O last 3 completed shifts:  In: 1530 [P.O.:480; I.V.:1000; IV PIGGYBACK:50]  Out: 950 [Urine:950]  No intake/output data recorded.     Current Medications:   Current Facility-Administered Medications:     albuterol (Ventolin) (2.5 MG/3ML) 0.083% nebulizer solution 2.5 mg, 2.5 mg, Nebulization, Q6H WA    albuterol (Ventolin) (2.5 MG/3ML) 0.083% nebulizer solution 2.5 mg, 2.5 mg, Nebulization, Q6H PRN    fluticasone furoate-vilanterol (Breo Ellipta) 100-25 MCG/ACT inhaler 1 puff, 1 puff, Inhalation, Daily    [COMPLETED] sodium chloride 0.9 % IV bolus 500 mL, 500 mL, Intravenous, Once **FOLLOWED BY** sodium chloride 0.9% infusion, , Intravenous, Continuous    oseltamivir (Tamiflu) cap 75 mg, 75 mg, Oral, Q12H    methylPREDNISolone sodium succinate (Solu-MEDROL) injection 40 mg, 40 mg, Intravenous, Q8H    losartan (Cozaar) 50 mg, hydroCHLOROthiazide (HYDRODIURIL) 12.5 mg for Hyzaar 50/12.5 ([facility] only), , Oral, Daily    montelukast (Singulair) tab 10 mg, 10 mg, Oral, Daily    acetaminophen (Tylenol Extra Strength) tab 1,000 mg, 1,000 mg, Oral, Q8H PRN    melatonin tab 3 mg, 3 mg, Oral, Nightly PRN    polyethylene glycol (PEG 3350) (Miralax) 17 g oral packet 17 g, 17 g, Oral, Daily PRN    sennosides (Senokot) tab 17.2 mg, 17.2 mg, Oral, Nightly PRN    bisacodyl (Dulcolax) 10 MG rectal suppository 10 mg, 10 mg, Rectal, Daily PRN    ondansetron (Zofran) 4 MG/2ML injection 4 mg, 4 mg, Intravenous, Q6H PRN    prochlorperazine (Compazine) 10  MG/2ML injection 5 mg, 5 mg, Intravenous, Q8H PRN    benzonatate (Tessalon) cap 200 mg, 200 mg, Oral, TID PRN    guaiFENesin (Robitussin) 100 MG/5 ML oral liquid 200 mg, 200 mg, Oral, Q4H PRN    glycerin-hypromellose- (Artifical Tears) 0.2-0.2-1 % ophthalmic solution 1 drop, 1 drop, Both Eyes, QID PRN    sodium chloride (Saline Mist) 0.65 % nasal solution 1 spray, 1 spray, Each Nare, Q3H PRN    enoxaparin (Lovenox) 60 MG/0.6ML SUBQ injection 60 mg, 0.5 mg/kg, Subcutaneous, Daily    ibuprofen (Motrin) tab 200 mg, 200 mg, Oral, Q4H PRN **OR** ibuprofen (Motrin) tab 400 mg, 400 mg, Oral, Q4H PRN **OR** ibuprofen (Motrin) tab 600 mg, 600 mg, Oral, Q4H PRN    glucose (Dex4) 15 GM/59ML oral liquid 15 g, 15 g, Oral, Q15 Min PRN **OR** glucose (Glutose) 40% oral gel 15 g, 15 g, Oral, Q15 Min PRN **OR** glucose-vitamin C (Dex-4) chewable tab 4 tablet, 4 tablet, Oral, Q15 Min PRN **OR** dextrose 50% injection 50 mL, 50 mL, Intravenous, Q15 Min PRN **OR** glucose (Dex4) 15 GM/59ML oral liquid 30 g, 30 g, Oral, Q15 Min PRN **OR** glucose (Glutose) 40% oral gel 30 g, 30 g, Oral, Q15 Min PRN **OR** glucose-vitamin C (Dex-4) chewable tab 8 tablet, 8 tablet, Oral, Q15 Min PRN    insulin aspart (NovoLOG) 100 Units/mL FlexPen 1-10 Units, 1-10 Units, Subcutaneous, TID AC and HS    buPROPion (Wellbutrin) tab 150 mg, 150 mg, Oral, Daily      Physical Exam:                          General: alert, cooperative, in NAD                          HEENT: oropharynx clear without erythema or exudates, moist mucous membranes                          Lungs: Clear to auscultation bilaterally, no wheezes or crackles                           Chest wall: No tenderness or deformity.                          Heart: Regular rate and rhythm, normal S1S2                          Abdomen: soft, non-tender, non-distended, positive BS.                          Extremity: No clubbing or cyanosis. no edema                          Skin: No rashes or  lesions.             Lab Results   Component Value Date    INR 0.99 03/14/2024          Imaging: I have independently visualized all relevant chest imaging in PACS.  I agree with the radiology interpretation except where noted.       ASSESSMENT/PLAN:  Acute hypoxic respiratory failure: 2/2 acute asthma exacerbation in setting of influenza  -CXR with increased interstitial infiltrates concerning for viral PNA vs pulmonary edema  -wean O2 as able  Asthma: with acute exacerbation  -IV steroids, will transition to PO on discharge  -start breo 100, will continue for minimum of 4 weeks   -singulair  -BD protocol  Influenza:  -tamiflu  CRISTIAN:  -cpap protocol  Dispo:  -please complete and document ambulatory O2 eval, if pt does not require supplemental oxygen would be okay from pulm perspective for discharge  -pt to follow up with dr. Mindy TERRY in 2 weeks     Gladys Salamanca MD  3/15/2024  3:04 PM

## 2024-03-15 NOTE — PROGRESS NOTES
NURSING DISCHARGE NOTE    Discharged Home via Wheelchair.  Accompanied by Spouse and Support staff  Belongings Taken by patient/family.    A+O x 4. Went over AVS with patient. Took out IV prior to discharge. Patient left in private car with . All questions answered. All belongings sent home with patient.

## 2024-03-15 NOTE — PROGRESS NOTES
[facility]   part of [facility]     Hospitalist Progress Note     Kaylee Shaikh Patient Status:  Inpatient    8/10/1965 MRN [id number]   Location [facility] 5NW-A Attending Parvez Gallego DO   Hosp Day # 2 PCP None Pcp     Chief Complaint: Shortness of breath     Subjective:     Pt feels better, states \"I'm ready to go home\"    Objective:    Review of Systems:   A comprehensive review of systems was completed; pertinent positive and negatives stated in subjective.    Vital signs:  Temp:  [97.7 °F (36.5 °C)-98.7 °F (37.1 °C)] 98 °F (36.7 °C)  Pulse:  [] 100  Resp:  [13-26] 20  BP: (128-143)/(75-94) 135/76  SpO2:  [92 %-97 %] 92 %    Physical Exam:    General: No acute distress  Respiratory: No wheezes, no rhonchi  Cardiovascular: S1, S2, regular rate and rhythm  Abdomen: Soft, Non-tender, non-distended, positive bowel sounds  Neuro: No new focal deficits.   Extremities: No edema      Diagnostic Data:    Labs:  Recent Labs   Lab 24  1631 24  0725   WBC 6.6 5.4   HGB 13.5 13.4   MCV 86.9 88.3   .0 296.0   INR  --  0.99       Recent Labs   Lab 24  1631 24  0725   * 156*   BUN 9 11   CREATSERUM 0.83 0.56   CA 9.1 8.8   ALB 3.3* 3.1*   * 137   K 3.8 3.9    106   CO2 25.0 26.0   ALKPHO 93 92   AST 18 22   ALT 23 25   BILT 0.3 0.2   TP 7.0 7.1       Estimated Creatinine Clearance: 94.6 mL/min (based on SCr of 0.56 mg/dL).    No results for input(s): \"TROP\", \"TROPHS\", \"CK\" in the last 168 hours.    Recent Labs   Lab 24  0725   PTP 13.1   INR 0.99                  Microbiology    No results found for this visit on 24.      Imaging: Reviewed in Epic.    Medications:    fluticasone furoate-vilanterol  1 puff Inhalation Daily    oseltamivir  75 mg Oral Q12H    albuterol  2.5 mg Nebulization Q4H WA (5 times daily)    methylPREDNISolone  40 mg Intravenous Q8H    losartan (Cozaar) 50 mg, hydroCHLOROthiazide (HYDRODIURIL) 12.5 mg for Hyzaar 50/12.5  ([facility] only)   Oral Daily    montelukast  10 mg Oral Daily    enoxaparin  0.5 mg/kg Subcutaneous Daily    insulin aspart  1-10 Units Subcutaneous TID AC and HS    buPROPion  150 mg Oral Daily       Assessment & Plan:      #Dispo  -plan for dispo later today s/p pulm eval and home o2 eval     #Acute hypoxemic respiratory failure  #Acute asthma exacerbation  #Flu A positive  -On room air at baseline, remains on 2 L nasal cannula, wean as tolerated  -MAGALI prn & scheduled, iv solumedrol;  prednisone on dc per pulm  -tamiflu (3/13 x5days)  -Pulmonary eval noted    #Hyponatremia  -s/p IV fluids  -Labs pending    #Diabetes mellitus type 2  -A1c 6.8  -Insulin sliding scale for now    #Essential hypertension  -PTA: Losartan-hydrochlorothiazide    #CRISTIAN    #Depression      #Morbid obesity  -BMI 43.50    Parvez Gallego DO    Supplementary Documentation:     Quality:  DVT Mechanical Prophylaxis:   SCDs, Early ambuation  DVT Pharmacologic Prophylaxis   Medication    enoxaparin (Lovenox) 60 MG/0.6ML SUBQ injection 60 mg                Code Status: Not on file  Remy: No urinary catheter in place  Remy Duration (in days):   Central line:    OMAIRA: 3/15/2024    Discharge is dependent on: Improvement in respiratory status  At this point Ms. Shaikh is expected to be discharge to: TBD    The 21st Century Cures Act makes medical notes like these available to patients in the interest of transparency. Please be advised this is a medical document. Medical documents are intended to carry relevant information, facts as evident, and the clinical opinion of the practitioner. The medical note is intended as peer to peer communication and may appear blunt or direct. It is written in medical language and may contain abbreviations or verbiage that are unfamiliar.

## 2024-03-16 NOTE — DISCHARGE SUMMARY
[facility]     Kaylee Shaikh Patient Status:  Inpatient    8/10/1965 MRN [id number]   Location [facility] 5NW-A Attending No att. providers found   Hosp Day # 2 PCP None Pcp     Date of Admission: 3/13/2024  Date of Discharge:  3/15/2024     Discharge Disposition: Home or Self Care    Discharge Diagnosis:  #Acute hypoxemic respiratory failure  #Acute asthma exacerbation  #Flu A positive  -On room air at baseline, remains on 2 L nasal cannula, wean as tolerated  -MAGALI prn & scheduled, iv solumedrol;  prednisone on dc per pulm  -tamiflu (3/13 x5days)  -Pulmonary eval noted     #Hyponatremia  -s/p IV fluids  -Labs pending     #Diabetes mellitus type 2  -A1c 6.8  -Insulin sliding scale for now     #Essential hypertension  -PTA: Losartan-hydrochlorothiazide     #CRISTIAN     #Depression      #Morbid obesity  -BMI 43.50    History of Present Illness: Kaylee Shaikh is a 58 year old female with PMHx Asthma, CRISTIAN, DM2 who presents for SOB.     Patient notes that she recently returned from travel on Monday, since then started having symptoms onset yesterday, started having increased shortness of breath, wheezing, fatigue, malaise.  Notes that she normally does not get asthma exacerbations, only takes Singulair.  Denies any associated chest pain, abdominal pain, nausea, vomiting.  Currently getting hour-long neb treatment but still having ongoing wheezing and chest tightness.    Brief Synopsis: Patient admitted for acute exacerbation of asthma secondary to flu positive.  Patient improvement with IV steroids and Tamiflu.  Patient seen by pulmonary agreed with plan.  Patient eventually weaned to room air.  At this point patient medically stable for discharge.    Lace+ Score: 57  59-90 High Risk  29-58 Medium Risk  0-28   Low Risk       TCM Follow-Up Recommendation:  LACE 29-58: Moderate Risk of readmission after discharge from the hospital.      Procedures during hospitalization:       Incidental or  significant findings and recommendations (brief descriptions):      Lab/Test results pending at Discharge:       Consultants:  Pulmonary    Discharge Medication List:     Discharge Medications        START taking these medications        Instructions Prescription details   fluticasone furoate-vilanterol 100-25 MCG/ACT Aepb  Commonly known as: Breo Ellipta      Inhale 1 puff into the lungs daily.   Quantity: 1 each  Refills: 0     oseltamivir 75 MG Caps  Commonly known as: Tamiflu      Take 1 capsule (75 mg total) by mouth every 12 (twelve) hours for 3 days.   Stop taking on: March 18, 2024  Quantity: 6 capsule  Refills: 0     predniSONE 20 MG Tabs  Commonly known as: Deltasone      Take 2 tabs daily x5 days, then one tab daily x5 days   Quantity: 15 tablet  Refills: 0            CONTINUE taking these medications        Instructions Prescription details   buPROPion 75 MG Tabs  Commonly known as: Wellbutrin      Take 2 tablets (150 mg total) by mouth daily.   Refills: 0     losartan-hydroCHLOROthiazide 50-12.5 MG Tabs  Commonly known as: Hyzaar      Take 1 tablet by mouth daily.   Refills: 0     montelukast 10 MG Tabs  Commonly known as: Singulair      Take 1 tablet (10 mg total) by mouth daily.   Refills: 0     Ozempic (0.25 or 0.5 MG/DOSE) 2 MG/3ML Sopn  Generic drug: semaglutide      INDICATIONS: TYPE 2 DIABETES START WITH 0.25 MG PER WEEK FOR 4 WEEKS AND THEN 0.5 MG EVERY WEEK   Refills: 0               Where to Get Your Medications        These medications were sent to [facility]/pharmacy #[id number] [place] [id number] [facility] AT [facility], [phone], [phone]  [address] [place] [address]      Phone: [phone]   fluticasone furoate-vilanterol 100-25 MCG/ACT Aepb  oseltamivir 75 MG Caps  predniSONE 20 MG Tabs         [place]     Follow-up appointment:   Natanael Guillen IV, MD  [address]  [facility] [address]  [facility] [address] [phone]    Follow up in 2  week(s)      Appointments for Next 30 Days 3/16/2024 - 4/15/2024      None            Vital signs:       Physical Exam:    General: No acute distress   Lungs: clear to auscultation  Cardiovascular: S1, S2  Abdomen: Soft      -----------------------------------------------------------------------------------------------  PATIENT DISCHARGE INSTRUCTIONS: See electronic chart    Parvez Gallego DO    Total time spent on discharge plannin minutes     The  Century Cures Act makes medical notes like these available to patients in the interest of transparency. Please be advised this is a medical document. Medical documents are intended to carry relevant information, facts as evident, and the clinical opinion of the practitioner. The medical note is intended as peer to peer communication and may appear blunt or direct. It is written in medical language and may contain abbreviations or verbiage that are unfamiliar.

## 2024-03-18 ENCOUNTER — PATIENT OUTREACH (OUTPATIENT)
Dept: CASE MANAGEMENT | Age: 59
End: 2024-03-18

## 2024-03-18 NOTE — PROGRESS NOTES
TCM chart review.  No TCM as patient follows with outside [facility] PCP.  Encounter closing.

## 2024-03-18 NOTE — PAYOR COMM NOTE
--------------  CONTINUED STAY REVIEW  3/14-3/15  Payor: [employer] CHOICE PLUS  Subscriber #:  [id number]  Authorization Number: [id number]    Admit date: 3/13/24  Admit time:  8:56 PM    Admitting Physician: James Villalba MD  Attending Physician:  No att. providers found  Primary Care Physician: Pcp, None    REVIEW DOCUMENTATION:  3/14  Admission Diagnosis: Hyponatremia [E87.1]  Influenza A [J10.1]  Hypoxia [R09.02]  Severe persistent asthma with exacerbation (HCC) [J45.51]  Reason for Consult: asthma exacerbation      History of Present Illness:  59 yo female with history of DM, asthma, CRISTIAN on cpap who presented on 3/13 with complaints of increased SOB, fatigue, and wheezing x 1 day.  Pt states that symptoms came on quickly and within 24 hours she had marked SOB.  No improvement with albuterol.  No associated chest pain, fevers, chills.  In the ER pt was wheezing.  Pt was started on steroids and nebs and admitted for further care.     Respiratory: +SOB, dyspnea on exertion, cough, wheezing     O2 requirement: 4L      Lungs: expiratory wheeze     Component Value Date     WBC 5.4 03/14/2024     RBC 4.53 03/14/2024     HGB 13.4 03/14/2024     HCT 40.0 03/14/2024     MCV 88.3 03/14/2024     MCH 29.6 03/14/2024     MCHC 33.5 03/14/2024     RDW 12.3 03/14/2024     .0 03/14/2024            Lab Results   Component Value Date      03/14/2024     K 3.9 03/14/2024      03/14/2024     CO2 26.0 03/14/2024     BUN 11 03/14/2024     CREATSERUM 0.56 03/14/2024      03/14/2024     CA 8.8 03/14/2024            Lab Results   Component Value Date      03/14/2024     K 3.9 03/14/2024      03/14/2024     CO2 26.0 03/14/2024     BUN 11 03/14/2024     CREATSERUM 0.56 03/14/2024      03/14/2024     CA 8.8 03/14/2024     ALKPHO 92 03/14/2024     ALT 25 03/14/2024     AST 22 03/14/2024     BILT 0.2 03/14/2024     ALB 3.1 03/14/2024     TP 7.1 03/14/2024            Lab  Results   Component Value Date     INR 0.99 03/14/2024         Imaging: I have independently visualized all relevant chest imaging in PACS.  I agree with the radiology interpretation except where noted.        ASSESSMENT/PLAN:  Acute hypoxic respiratory failure: 2/2 acute asthma exacerbation in setting of influenza  -CXR with increased interstitial infiltrates concerning for viral PNA vs pulmonary edema  -wean O2 as able  Asthma: with acute exacerbation  -IV steroids  -start breo 100  -singulair  -BD protocol  Influenza:  -tamiflu  CRISTIAN:  -cpap protocol     Vitals (last day) before discharge       Date/Time Temp Pulse Resp BP SpO2 Weight O2 Device O2 Flow Rate (L/min) [facility]    03/15/24 1722 98 °F (36.7 °C) 65 15 145/86 92 % -- None (Room air) -- MR    03/15/24 1451 -- 72 13 -- 89 % -- -- -- DL    03/15/24 1205 97.8 °F (36.6 °C) 69 19 118/77 91 % -- Nasal cannula 2 L/min MR    03/15/24 1124 -- 105 22 -- 91 % -- -- -- DL    03/15/24 0900 98 °F (36.7 °C) 100 20 135/76 92 % -- Nasal cannula 2 L/min MR    03/15/24 0711 -- -- -- -- -- -- Nasal cannula 2 L/min     03/15/24 0515 98 °F (36.7 °C) 82 24 143/94 95 % -- CPAP -- MA    03/14/24 2356 97.7 °F (36.5 °C) 92 20 131/75 96 % -- Nasal cannula 2 L/min SC    03/14/24 1950 98 °F (36.7 °C) 108 20 135/79 93 % -- Nasal cannula 2 L/min MA    03/14/24 1723 -- 92 22 -- 92 % -- Nasal cannula 4 L/min EO    03/14/24 1602 -- 83 26 -- 96 % -- Nasal cannula 4 L/min EO    03/14/24 1210 -- 82 13 -- 97 % -- Nasal cannula 4 L/min EO    03/14/24 1122 98.7 °F (37.1 °C) -- 18 128/77 -- -- -- -- WB    03/14/24 0924 98.5 °F (36.9 °C) -- 18 117/78 -- -- -- --     03/14/24 0819 -- 83 23 -- 97 % -- Nasal cannula -- EO               --------------  DISCHARGE REVIEW    Payor: [employer] CHOICE [employer]US  Subscriber #:  [id number]  Authorization Number: [id number]    Admit date: 3/13/24  Admit time:   8:56 PM  Discharge Date: 3/15/2024  6:24 PM     Admitting Physician: James Villalba  MD Paresh  Attending Physician:  No att. providers found  Primary Care Physician: Pcp, None          Discharge Summary Notes        Discharge Summary signed by Parvez Gallego DO at 3/16/2024  5:30 PM       Author: Parvez Gallego DO Specialty: HOSPITALIST Author Type: Physician    Filed: 3/16/2024  5:30 PM Date of Service: 3/15/2024  5:29 PM Status: Signed    : Parvez Gallego DO (Physician)           [facility]     Kaylee Shaikh Patient Status:  Inpatient    8/10/1965 MRN [id number]   Location [facility] 5NW-A Attending No att. providers found   Hosp Day # 2 PCP None Pcp     Date of Admission: 3/13/2024  Date of Discharge:  3/15/2024     Discharge Disposition: Home or Self Care    Discharge Diagnosis:  #Acute hypoxemic respiratory failure  #Acute asthma exacerbation  #Flu A positive  -On room air at baseline, remains on 2 L nasal cannula, wean as tolerated  -MAGALI prn & scheduled, iv solumedrol;  prednisone on dc per pulm  -tamiflu (3/13 x5days)  -Pulmonary eval noted     #Hyponatremia  -s/p IV fluids  -Labs pending     #Diabetes mellitus type 2  -A1c 6.8  -Insulin sliding scale for now     #Essential hypertension  -PTA: Losartan-hydrochlorothiazide     #CRISTIAN     #Depression      #Morbid obesity  -BMI 43.50    History of Present Illness: Kaylee Shaikh is a 58 year old female with PMHx Asthma, CRISTIAN, DM2 who presents for SOB.     Patient notes that she recently returned from travel on Monday, since then started having symptoms onset yesterday, started having increased shortness of breath, wheezing, fatigue, malaise.  Notes that she normally does not get asthma exacerbations, only takes Singulair.  Denies any associated chest pain, abdominal pain, nausea, vomiting.  Currently getting hour-long neb treatment but still having ongoing wheezing and chest tightness.    Brief Synopsis: Patient admitted for acute exacerbation of asthma secondary to flu positive.  Patient improvement with IV  steroids and Tamiflu.  Patient seen by pulmonary agreed with plan.  Patient eventually weaned to room air.  At this point patient medically stable for discharge.    Lace+ Score: 57  59-90 High Risk  29-58 Medium Risk  0-28   Low Risk       TCM Follow-Up Recommendation:  LACE 29-58: Moderate Risk of readmission after discharge from the hospital.      Procedures during hospitalization:       Incidental or significant findings and recommendations (brief descriptions):      Lab/Test results pending at Discharge:       Consultants:  Pulmonary    Discharge Medication List:     Discharge Medications        START taking these medications        Instructions Prescription details   fluticasone furoate-vilanterol 100-25 MCG/ACT Aepb  Commonly known as: Breo Ellipta      Inhale 1 puff into the lungs daily.   Quantity: 1 each  Refills: 0     oseltamivir 75 MG Caps  Commonly known as: Tamiflu      Take 1 capsule (75 mg total) by mouth every 12 (twelve) hours for 3 days.   Stop taking on: March 18, 2024  Quantity: 6 capsule  Refills: 0     predniSONE 20 MG Tabs  Commonly known as: Deltasone      Take 2 tabs daily x5 days, then one tab daily x5 days   Quantity: 15 tablet  Refills: 0            CONTINUE taking these medications        Instructions Prescription details   buPROPion 75 MG Tabs  Commonly known as: Wellbutrin      Take 2 tablets (150 mg total) by mouth daily.   Refills: 0     losartan-hydroCHLOROthiazide 50-12.5 MG Tabs  Commonly known as: Hyzaar      Take 1 tablet by mouth daily.   Refills: 0     montelukast 10 MG Tabs  Commonly known as: Singulair      Take 1 tablet (10 mg total) by mouth daily.   Refills: 0     Ozempic (0.25 or 0.5 MG/DOSE) 2 MG/3ML Sopn  Generic drug: semaglutide      INDICATIONS: TYPE 2 DIABETES START WITH 0.25 MG PER WEEK FOR 4 WEEKS AND THEN 0.5 MG EVERY WEEK   Refills: 0               Where to Get Your Medications        These medications were sent to [facility]/pharmacy #[id number] - [place], [place] - [address] AT [facility], [phone], [phone]  [address], [facility] [address]      Phone: [phone]   fluticasone furoate-vilanterol 100-25 MCG/ACT Aepb  oseltamivir 75 MG Caps  predniSONE 20 MG Tabs         [place] reviewed:     Follow-up appointment:   Natanael Guillen IV, MD  [address] [place], [place] [address] [address]  [facility] [address] [phone]    Follow up in 2 week(s)      Appointments for Next 30 Days 3/16/2024 - 4/15/2024      None            Vital signs:       Physical Exam:    General: No acute distress   Lungs: clear to auscultation  Cardiovascular: S1, S2  Abdomen: Soft      -----------------------------------------------------------------------------------------------  PATIENT DISCHARGE INSTRUCTIONS: See electronic chart    Parvez Gallego DO    Total time spent on discharge plannin minutes     The  [employer] C[employer] Act makes medical notes like these available to patients in the interest of transparency. Please be advised this is a medical document. Medical documents are intended to carry relevant information, facts as evident, and the clinical opinion of the practitioner. The medical note is intended as peer to peer communication and may appear blunt or direct. It is written in medical language and may contain abbreviations or verbiage that are unfamiliar.       Electronically signed by Parvez Gallego DO on 3/16/2024  5:30 PM         REVIEWER COMMENTS

## 2024-04-01 ENCOUNTER — HOSPITAL ENCOUNTER (OUTPATIENT)
Dept: BONE DENSITY | Age: 59
Discharge: HOME | End: 2024-04-01
Attending: INTERNAL MEDICINE
Payer: COMMERCIAL

## 2024-04-01 DIAGNOSIS — Z13.820 SCREENING FOR OSTEOPOROSIS: ICD-10-CM

## 2024-04-01 PROCEDURE — 77080 DXA BONE DENSITY AXIAL: CPT | Performed by: INTERNAL MEDICINE

## 2024-04-03 ENCOUNTER — TELEPHONE (OUTPATIENT)
Dept: FAMILY MEDICINE | Age: 59
End: 2024-04-03

## 2024-04-11 ENCOUNTER — E-ADVICE (OUTPATIENT)
Dept: INTERNAL MEDICINE | Age: 59
End: 2024-04-11

## 2024-04-16 RX ORDER — LOSARTAN POTASSIUM AND HYDROCHLOROTHIAZIDE 12.5; 5 MG/1; MG/1
TABLET ORAL
Qty: 90 TABLET | Refills: 1 | Status: SHIPPED | OUTPATIENT
Start: 2024-04-16

## 2024-07-02 ENCOUNTER — APPOINTMENT (OUTPATIENT)
Dept: INTERNAL MEDICINE | Age: 59
End: 2024-07-02

## 2024-07-08 ENCOUNTER — E-ADVICE (OUTPATIENT)
Dept: INTERNAL MEDICINE | Age: 59
End: 2024-07-08

## 2024-07-08 DIAGNOSIS — J45.909 UNCOMPLICATED ASTHMA, UNSPECIFIED ASTHMA SEVERITY, UNSPECIFIED WHETHER PERSISTENT (CMD): ICD-10-CM

## 2024-07-08 RX ORDER — ALBUTEROL SULFATE 90 UG/1
2 AEROSOL, METERED RESPIRATORY (INHALATION) EVERY 4 HOURS PRN
Qty: 1 EACH | Refills: 1 | Status: SHIPPED | OUTPATIENT
Start: 2024-07-08 | End: 2024-08-07

## 2024-07-08 RX ORDER — METHYLPREDNISOLONE 4 MG/1
4 TABLET ORAL SEE ADMIN INSTRUCTIONS
Qty: 21 TABLET | Refills: 0 | Status: SHIPPED | OUTPATIENT
Start: 2024-07-08

## 2024-07-12 DIAGNOSIS — J30.1 SEASONAL ALLERGIC RHINITIS DUE TO POLLEN: ICD-10-CM

## 2024-07-12 RX ORDER — MONTELUKAST SODIUM 10 MG/1
TABLET ORAL
Qty: 90 TABLET | Refills: 1 | Status: SHIPPED | OUTPATIENT
Start: 2024-07-12

## 2024-07-18 ENCOUNTER — E-ADVICE (OUTPATIENT)
Dept: INTERNAL MEDICINE | Age: 59
End: 2024-07-18

## 2024-07-24 ENCOUNTER — APPOINTMENT (OUTPATIENT)
Dept: INTERNAL MEDICINE | Age: 59
End: 2024-07-24

## 2024-08-01 ENCOUNTER — OFFICE VISIT (OUTPATIENT)
Dept: INTERNAL MEDICINE | Age: 59
End: 2024-08-01

## 2024-08-01 VITALS
OXYGEN SATURATION: 99 % | DIASTOLIC BLOOD PRESSURE: 76 MMHG | HEIGHT: 64 IN | HEART RATE: 78 BPM | WEIGHT: 261 LBS | BODY MASS INDEX: 44.56 KG/M2 | SYSTOLIC BLOOD PRESSURE: 115 MMHG | RESPIRATION RATE: 18 BRPM

## 2024-08-01 DIAGNOSIS — Z00.00 ENCOUNTER FOR GENERAL ADULT MEDICAL EXAMINATION W/O ABNORMAL FINDINGS: Primary | ICD-10-CM

## 2024-08-01 DIAGNOSIS — I10 ESSENTIAL HYPERTENSION: ICD-10-CM

## 2024-08-01 DIAGNOSIS — E11.69 TYPE 2 DIABETES MELLITUS WITH MORBID OBESITY  (CMD): ICD-10-CM

## 2024-08-01 DIAGNOSIS — E66.01 CLASS 3 SEVERE OBESITY DUE TO EXCESS CALORIES WITHOUT SERIOUS COMORBIDITY WITH BODY MASS INDEX (BMI) OF 40.0 TO 44.9 IN ADULT  (CMD): ICD-10-CM

## 2024-08-01 DIAGNOSIS — E66.01 TYPE 2 DIABETES MELLITUS WITH MORBID OBESITY  (CMD): ICD-10-CM

## 2024-08-01 DIAGNOSIS — E55.9 VITAMIN D DEFICIENCY DISEASE: ICD-10-CM

## 2024-08-01 DIAGNOSIS — J42 CHRONIC BRONCHITIS, UNSPECIFIED CHRONIC BRONCHITIS TYPE  (CMD): ICD-10-CM

## 2024-08-01 PROCEDURE — 99396 PREV VISIT EST AGE 40-64: CPT | Performed by: INTERNAL MEDICINE

## 2024-08-01 PROCEDURE — 3074F SYST BP LT 130 MM HG: CPT | Performed by: INTERNAL MEDICINE

## 2024-08-01 PROCEDURE — 3078F DIAST BP <80 MM HG: CPT | Performed by: INTERNAL MEDICINE

## 2024-08-01 SDOH — ECONOMIC STABILITY: FOOD INSECURITY: WITHIN THE PAST 12 MONTHS, THE FOOD YOU BOUGHT JUST DIDN'T LAST AND YOU DIDN'T HAVE MONEY TO GET MORE.: NEVER TRUE

## 2024-08-01 SDOH — SOCIAL STABILITY: SOCIAL NETWORK
HOW OFTEN DO YOU SEE OR TALK TO PEOPLE THAT YOU CARE ABOUT AND FEEL CLOSE TO? (FOR EXAMPLE: TALKING TO FRIENDS ON THE PHONE, VISITING FRIENDS OR FAMILY, GOING TO CHURCH OR CLUB MEETINGS): 3 TO 5 TIMES A WEEK

## 2024-08-01 SDOH — ECONOMIC STABILITY: HOUSING INSECURITY: DO YOU HAVE PROBLEMS WITH ANY OF THE FOLLOWING?: NONE OF THE ABOVE

## 2024-08-01 SDOH — ECONOMIC STABILITY: TRANSPORTATION INSECURITY
IN THE PAST 12 MONTHS, HAS LACK OF RELIABLE TRANSPORTATION KEPT YOU FROM MEDICAL APPOINTMENTS, MEETINGS, WORK OR FROM GETTING THINGS NEEDED FOR DAILY LIVING?: NO

## 2024-08-01 SDOH — HEALTH STABILITY: PHYSICAL HEALTH: ON AVERAGE, HOW MANY MINUTES DO YOU ENGAGE IN EXERCISE AT THIS LEVEL?: 30 MIN

## 2024-08-01 SDOH — ECONOMIC STABILITY: HOUSING INSECURITY: WHAT IS YOUR LIVING SITUATION TODAY?: I HAVE A STEADY PLACE TO LIVE

## 2024-08-01 SDOH — HEALTH STABILITY: PHYSICAL HEALTH: ON AVERAGE, HOW MANY DAYS PER WEEK DO YOU ENGAGE IN MODERATE TO STRENUOUS EXERCISE (LIKE A BRISK WALK)?: 2 DAYS

## 2024-08-01 SDOH — ECONOMIC STABILITY: GENERAL

## 2024-08-01 ASSESSMENT — ENCOUNTER SYMPTOMS
SHORTNESS OF BREATH: 1
SLEEP DISTURBANCE: 1
FATIGUE: 1
NEUROLOGICAL NEGATIVE: 1
GASTROINTESTINAL NEGATIVE: 1
EYES NEGATIVE: 1
ENDOCRINE NEGATIVE: 1
HEMATOLOGIC/LYMPHATIC NEGATIVE: 1
ALLERGIC/IMMUNOLOGIC NEGATIVE: 1

## 2024-08-01 ASSESSMENT — PATIENT HEALTH QUESTIONNAIRE - PHQ9
SUM OF ALL RESPONSES TO PHQ9 QUESTIONS 1 AND 2: 0
1. LITTLE INTEREST OR PLEASURE IN DOING THINGS: NOT AT ALL
SUM OF ALL RESPONSES TO PHQ9 QUESTIONS 1 AND 2: 0
2. FEELING DOWN, DEPRESSED OR HOPELESS: NOT AT ALL
CLINICAL INTERPRETATION OF PHQ2 SCORE: NO FURTHER SCREENING NEEDED

## 2024-08-01 ASSESSMENT — LIFESTYLE VARIABLES
AUDIT-C TOTAL SCORE: 2
HOW OFTEN DO YOU HAVE A DRINK CONTAINING ALCOHOL: 2 TO 4 TIMES PER MONTH
HOW MANY STANDARD DRINKS CONTAINING ALCOHOL DO YOU HAVE ON A TYPICAL DAY: 0,1 OR 2

## 2024-08-01 ASSESSMENT — SOCIAL DETERMINANTS OF HEALTH (SDOH): IN THE PAST 12 MONTHS, HAS THE ELECTRIC, GAS, OIL, OR WATER COMPANY THREATENED TO SHUT OFF SERVICE IN YOUR HOME?: NO

## 2024-08-01 ASSESSMENT — PAIN SCALES - GENERAL: PAINLEVEL: 0

## 2024-08-02 ENCOUNTER — LAB SERVICES (OUTPATIENT)
Dept: LAB | Age: 59
End: 2024-08-02

## 2024-08-02 DIAGNOSIS — E11.69 TYPE 2 DIABETES MELLITUS WITH MORBID OBESITY  (CMD): ICD-10-CM

## 2024-08-02 DIAGNOSIS — E66.01 TYPE 2 DIABETES MELLITUS WITH MORBID OBESITY  (CMD): ICD-10-CM

## 2024-08-02 DIAGNOSIS — I10 ESSENTIAL HYPERTENSION: ICD-10-CM

## 2024-08-02 DIAGNOSIS — Z12.11 SPECIAL SCREENING FOR MALIGNANT NEOPLASM OF COLON: ICD-10-CM

## 2024-08-02 DIAGNOSIS — Z00.00 ENCOUNTER FOR GENERAL ADULT MEDICAL EXAMINATION W/O ABNORMAL FINDINGS: ICD-10-CM

## 2024-08-02 DIAGNOSIS — E66.01 CLASS 3 SEVERE OBESITY DUE TO EXCESS CALORIES WITHOUT SERIOUS COMORBIDITY WITH BODY MASS INDEX (BMI) OF 40.0 TO 44.9 IN ADULT  (CMD): ICD-10-CM

## 2024-08-02 PROCEDURE — 80061 LIPID PANEL: CPT | Performed by: CLINICAL MEDICAL LABORATORY

## 2024-08-02 PROCEDURE — 82043 UR ALBUMIN QUANTITATIVE: CPT | Performed by: CLINICAL MEDICAL LABORATORY

## 2024-08-02 PROCEDURE — 82306 VITAMIN D 25 HYDROXY: CPT | Performed by: CLINICAL MEDICAL LABORATORY

## 2024-08-02 PROCEDURE — 80050 GENERAL HEALTH PANEL: CPT | Performed by: CLINICAL MEDICAL LABORATORY

## 2024-08-02 PROCEDURE — 36415 COLL VENOUS BLD VENIPUNCTURE: CPT | Performed by: INTERNAL MEDICINE

## 2024-08-02 PROCEDURE — 82274 ASSAY TEST FOR BLOOD FECAL: CPT | Performed by: CLINICAL MEDICAL LABORATORY

## 2024-08-02 PROCEDURE — 82570 ASSAY OF URINE CREATININE: CPT | Performed by: CLINICAL MEDICAL LABORATORY

## 2024-08-02 PROCEDURE — 83036 HEMOGLOBIN GLYCOSYLATED A1C: CPT | Performed by: CLINICAL MEDICAL LABORATORY

## 2024-08-03 LAB
25(OH)D3+25(OH)D2 SERPL-MCNC: 43.6 NG/ML (ref 30–100)
ALBUMIN SERPL-MCNC: 3.7 G/DL (ref 3.6–5.1)
ALBUMIN/GLOB SERPL: 1.2 {RATIO} (ref 1–2.4)
ALP SERPL-CCNC: 112 UNITS/L (ref 45–117)
ALT SERPL-CCNC: 39 UNITS/L
ANION GAP SERPL CALC-SCNC: 11 MMOL/L (ref 7–19)
AST SERPL-CCNC: 20 UNITS/L
BASOPHILS # BLD: 0.1 K/MCL (ref 0–0.3)
BASOPHILS NFR BLD: 1 %
BILIRUB SERPL-MCNC: 0.4 MG/DL (ref 0.2–1)
BUN SERPL-MCNC: 13 MG/DL (ref 6–20)
BUN/CREAT SERPL: 18 (ref 7–25)
CALCIUM SERPL-MCNC: 9.2 MG/DL (ref 8.4–10.2)
CHLORIDE SERPL-SCNC: 105 MMOL/L (ref 97–110)
CHOLEST SERPL-MCNC: 209 MG/DL
CHOLEST/HDLC SERPL: 3.4 {RATIO}
CO2 SERPL-SCNC: 27 MMOL/L (ref 21–32)
CREAT SERPL-MCNC: 0.74 MG/DL (ref 0.51–0.95)
CREAT UR-MCNC: 196 MG/DL
DEPRECATED RDW RBC: 45.1 FL (ref 39–50)
EGFRCR SERPLBLD CKD-EPI 2021: >90 ML/MIN/{1.73_M2}
EOSINOPHIL # BLD: 0.4 K/MCL (ref 0–0.5)
EOSINOPHIL NFR BLD: 4 %
ERYTHROCYTE [DISTWIDTH] IN BLOOD: 12.9 % (ref 11–15)
FASTING DURATION TIME PATIENT: 10 HOURS (ref 0–999)
GLOBULIN SER-MCNC: 3.2 G/DL (ref 2–4)
GLUCOSE SERPL-MCNC: 113 MG/DL (ref 70–99)
HBA1C MFR BLD: 6.3 % (ref 4.5–5.6)
HCT VFR BLD CALC: 45.4 % (ref 36–46.5)
HDLC SERPL-MCNC: 61 MG/DL
HEMOCCULT STL QL: NEGATIVE
HGB BLD-MCNC: 13.9 G/DL (ref 12–15.5)
IMM GRANULOCYTES # BLD AUTO: 0 K/MCL (ref 0–0.2)
IMM GRANULOCYTES # BLD: 0 %
LDLC SERPL CALC-MCNC: 117 MG/DL
LYMPHOCYTES # BLD: 2.3 K/MCL (ref 1–4)
LYMPHOCYTES NFR BLD: 27 %
MCH RBC QN AUTO: 29.7 PG (ref 26–34)
MCHC RBC AUTO-ENTMCNC: 30.6 G/DL (ref 32–36.5)
MCV RBC AUTO: 97 FL (ref 78–100)
MICROALBUMIN UR-MCNC: 2.2 MG/DL
MICROALBUMIN/CREAT UR: 11.2 MG/G
MONOCYTES # BLD: 0.5 K/MCL (ref 0.3–0.9)
MONOCYTES NFR BLD: 6 %
NEUTROPHILS # BLD: 5.3 K/MCL (ref 1.8–7.7)
NEUTROPHILS NFR BLD: 62 %
NONHDLC SERPL-MCNC: 148 MG/DL
NRBC BLD MANUAL-RTO: 0 /100 WBC
PLATELET # BLD AUTO: 387 K/MCL (ref 140–450)
POTASSIUM SERPL-SCNC: 4.6 MMOL/L (ref 3.4–5.1)
PROT SERPL-MCNC: 6.9 G/DL (ref 6.4–8.2)
RBC # BLD: 4.68 MIL/MCL (ref 4–5.2)
SODIUM SERPL-SCNC: 138 MMOL/L (ref 135–145)
TRIGL SERPL-MCNC: 153 MG/DL
TSH SERPL-ACNC: 2.32 MCUNITS/ML (ref 0.35–5)
WBC # BLD: 8.6 K/MCL (ref 4.2–11)

## 2024-08-29 ENCOUNTER — E-ADVICE (OUTPATIENT)
Dept: INTERNAL MEDICINE | Age: 59
End: 2024-08-29

## 2024-09-13 ENCOUNTER — E-ADVICE (OUTPATIENT)
Dept: INTERNAL MEDICINE | Age: 59
End: 2024-09-13

## 2024-09-17 ENCOUNTER — TELEPHONE (OUTPATIENT)
Dept: FAMILY MEDICINE | Age: 59
End: 2024-09-17

## 2024-09-24 RX ORDER — LOSARTAN POTASSIUM AND HYDROCHLOROTHIAZIDE 12.5; 5 MG/1; MG/1
TABLET ORAL
Qty: 90 TABLET | Refills: 1 | Status: SHIPPED | OUTPATIENT
Start: 2024-09-24

## 2024-10-04 ENCOUNTER — E-ADVICE (OUTPATIENT)
Dept: INTERNAL MEDICINE | Age: 59
End: 2024-10-04

## 2024-10-08 DIAGNOSIS — F32.5 MAJOR DEPRESSIVE DISORDER IN FULL REMISSION, UNSPECIFIED WHETHER RECURRENT (CMD): ICD-10-CM

## 2024-10-08 RX ORDER — BUPROPION HYDROCHLORIDE 300 MG/1
300 TABLET ORAL DAILY
Qty: 90 TABLET | Refills: 3 | Status: SHIPPED | OUTPATIENT
Start: 2024-10-08

## 2024-10-29 ENCOUNTER — APPOINTMENT (OUTPATIENT)
Dept: INTERNAL MEDICINE | Age: 59
End: 2024-10-29

## 2024-11-04 SDOH — ECONOMIC STABILITY: GENERAL: WOULD YOU LIKE HELP WITH ANY OF THE FOLLOWING NEEDS?: I DON'T WANT HELP WITH ANY OF THESE

## 2024-11-04 SDOH — ECONOMIC STABILITY: HOUSING INSECURITY: DO YOU HAVE PROBLEMS WITH ANY OF THE FOLLOWING?: NONE OF THE ABOVE

## 2024-11-04 SDOH — ECONOMIC STABILITY: FOOD INSECURITY: WITHIN THE PAST 12 MONTHS, THE FOOD YOU BOUGHT JUST DIDN'T LAST AND YOU DIDN'T HAVE MONEY TO GET MORE.: NEVER TRUE

## 2024-11-04 SDOH — ECONOMIC STABILITY: HOUSING INSECURITY: WHAT IS YOUR LIVING SITUATION TODAY?: I HAVE A STEADY PLACE TO LIVE

## 2024-11-04 ASSESSMENT — SOCIAL DETERMINANTS OF HEALTH (SDOH): IN THE PAST 12 MONTHS, HAS THE ELECTRIC, GAS, OIL, OR WATER COMPANY THREATENED TO SHUT OFF SERVICE IN YOUR HOME?: NO

## 2024-11-05 ENCOUNTER — V-VISIT (OUTPATIENT)
Dept: INTERNAL MEDICINE | Age: 59
End: 2024-11-05

## 2024-11-05 DIAGNOSIS — F32.5 MAJOR DEPRESSIVE DISORDER WITH SINGLE EPISODE, IN FULL REMISSION (CMD): ICD-10-CM

## 2024-11-05 DIAGNOSIS — M16.10 ARTHRITIS PAIN OF HIP: Primary | ICD-10-CM

## 2024-11-05 DIAGNOSIS — F32.4 MAJOR DEPRESSIVE DISORDER WITH SINGLE EPISODE, IN PARTIAL REMISSION (CMD): ICD-10-CM

## 2024-11-05 PROCEDURE — 99213 OFFICE O/P EST LOW 20 MIN: CPT | Performed by: INTERNAL MEDICINE

## 2024-11-05 RX ORDER — CELECOXIB 200 MG/1
200 CAPSULE ORAL DAILY
Qty: 90 CAPSULE | Refills: 1 | Status: SHIPPED | OUTPATIENT
Start: 2024-11-05

## 2024-11-05 RX ORDER — HYDROCODONE BITARTRATE AND ACETAMINOPHEN 10; 325 MG/1; MG/1
1 TABLET ORAL EVERY 6 HOURS PRN
Qty: 40 TABLET | Refills: 0 | Status: SHIPPED | OUTPATIENT
Start: 2024-11-05

## 2024-11-05 ASSESSMENT — PATIENT HEALTH QUESTIONNAIRE - PHQ9
SUM OF ALL RESPONSES TO PHQ9 QUESTIONS 1 AND 2: 0
SUM OF ALL RESPONSES TO PHQ9 QUESTIONS 1 AND 2: 0
2. FEELING DOWN, DEPRESSED OR HOPELESS: NOT AT ALL
CLINICAL INTERPRETATION OF PHQ2 SCORE: NO FURTHER SCREENING NEEDED
1. LITTLE INTEREST OR PLEASURE IN DOING THINGS: NOT AT ALL

## 2024-11-05 ASSESSMENT — ENCOUNTER SYMPTOMS: FATIGUE: 1

## 2024-11-15 ENCOUNTER — E-ADVICE (OUTPATIENT)
Dept: INTERNAL MEDICINE | Age: 59
End: 2024-11-15

## 2024-11-15 DIAGNOSIS — J30.1 SEASONAL ALLERGIC RHINITIS DUE TO POLLEN: ICD-10-CM

## 2024-11-17 ENCOUNTER — HOSPITAL ENCOUNTER (OUTPATIENT)
Age: 59
Discharge: HOME | End: 2024-11-17
Payer: COMMERCIAL

## 2024-11-17 ENCOUNTER — APPOINTMENT (OUTPATIENT)
Dept: GENERAL RADIOLOGY | Age: 59
End: 2024-11-17
Attending: NURSE PRACTITIONER
Payer: COMMERCIAL

## 2024-11-17 VITALS
WEIGHT: 239 LBS | HEART RATE: 90 BPM | RESPIRATION RATE: 20 BRPM | SYSTOLIC BLOOD PRESSURE: 125 MMHG | BODY MASS INDEX: 40.8 KG/M2 | OXYGEN SATURATION: 95 % | TEMPERATURE: 100 F | HEIGHT: 64 IN | DIASTOLIC BLOOD PRESSURE: 73 MMHG

## 2024-11-17 DIAGNOSIS — J45.41 MODERATE PERSISTENT ASTHMA WITH EXACERBATION (HCC): Primary | ICD-10-CM

## 2024-11-17 DIAGNOSIS — J06.9 VIRAL URI WITH COUGH: ICD-10-CM

## 2024-11-17 LAB
POCT INFLUENZA A: NEGATIVE
POCT INFLUENZA B: NEGATIVE
SARS-COV-2 RNA RESP QL NAA+PROBE: NOT DETECTED

## 2024-11-17 PROCEDURE — 99214 OFFICE O/P EST MOD 30 MIN: CPT

## 2024-11-17 PROCEDURE — 71046 X-RAY EXAM CHEST 2 VIEWS: CPT | Performed by: NURSE PRACTITIONER

## 2024-11-17 PROCEDURE — 94640 AIRWAY INHALATION TREATMENT: CPT

## 2024-11-17 PROCEDURE — 87502 INFLUENZA DNA AMP PROBE: CPT | Performed by: NURSE PRACTITIONER

## 2024-11-17 RX ORDER — PREDNISONE 20 MG/1
60 TABLET ORAL ONCE
Status: COMPLETED | OUTPATIENT
Start: 2024-11-17 | End: 2024-11-17

## 2024-11-17 RX ORDER — ALBUTEROL SULFATE 90 UG/1
2 INHALANT RESPIRATORY (INHALATION) EVERY 4 HOURS PRN
Qty: 1 EACH | Refills: 0 | Status: SHIPPED | OUTPATIENT
Start: 2024-11-17 | End: 2024-12-17

## 2024-11-17 RX ORDER — ALBUTEROL SULFATE 0.83 MG/ML
2.5 SOLUTION RESPIRATORY (INHALATION) EVERY 4 HOURS PRN
Qty: 30 EACH | Refills: 0 | Status: SHIPPED | OUTPATIENT
Start: 2024-11-17 | End: 2024-12-17

## 2024-11-17 RX ORDER — ALBUTEROL SULFATE 0.83 MG/ML
2.5 SOLUTION RESPIRATORY (INHALATION) ONCE
Status: COMPLETED | OUTPATIENT
Start: 2024-11-17 | End: 2024-11-17

## 2024-11-17 RX ORDER — ALBUTEROL SULFATE 0.83 MG/ML
2.5 SOLUTION RESPIRATORY (INHALATION) ONCE
Status: DISCONTINUED | OUTPATIENT
Start: 2024-11-17 | End: 2024-11-17

## 2024-11-17 RX ORDER — IPRATROPIUM BROMIDE AND ALBUTEROL SULFATE 2.5; .5 MG/3ML; MG/3ML
3 SOLUTION RESPIRATORY (INHALATION) ONCE
Status: COMPLETED | OUTPATIENT
Start: 2024-11-17 | End: 2024-11-17

## 2024-11-17 RX ORDER — PREDNISONE 20 MG/1
60 TABLET ORAL DAILY
Qty: 12 TABLET | Refills: 0 | Status: SHIPPED | OUTPATIENT
Start: 2024-11-17 | End: 2024-11-21

## 2024-11-17 NOTE — ED INITIAL ASSESSMENT (HPI)
Patient reports she got sick about 2 weeks ago and over the past week has had more trouble with breathing and fatigue. States she has observed O2 sats in low 80s at home. Arrives in low 90s but increases once sitting up. Dry, bronchospasm type cough. No fever. Admits to some lightheadedness as well. No chest pain. No swelling reported, using inhaler as needed. Saw pulmonology on Monday and prescribed prednisone.

## 2024-11-17 NOTE — ED PROVIDER NOTES
Patient Seen in: [facility]      History     Chief Complaint   Patient presents with    Difficulty Breathing     Stated Complaint: sob    Subjective:   HPI    Patient is a pleasant 59-year-old female with asthma, diabetes and history of pneumonia here for evaluation of worsening cough and shortness of breath.  Symptoms initially started about 2 weeks ago.  Over the past week, patient has had increasing shortness of breath and fatigue.  Patient describes cough is dry and bronchospastic cough.  Denies fevers.  Denies chest pain.  No lower extremity edema.  Has been utilizing her inhaler.  Saw her pulmonologist on Monday and was prescribed prednisone 20 mg daily.  Has had minimal improvement with this medication.      Objective:     Past Medical History:    Asthma (HCC)    CPAP (continuous positive airway pressure) dependence    Diabetes (HCC)    Pneumonia              Past Surgical History:   Procedure Laterality Date    Needle biopsy left      early 20's                Social History     Socioeconomic History    Marital status:    Tobacco Use    Smoking status: Never    Smokeless tobacco: Never     Social Drivers of Health     Financial Resource Strain: Low Risk  (8/1/2024)    Received from [facility]    Financial Resource Strain     In the past year, have you or any family members you live with been unable to get any of the following when it was really needed? Check all that apply.: None   Food Insecurity: Low Risk  (11/4/2024)    Received from [facility]    Food Insecurity     Within the past 12 months, you worried that your food would run out before you got money to buy more.  : Never true     Within the past 12 months, the food you bought just didn't last and you didn't have money to get more. : Never true   Transportation Needs: Not At Risk (11/4/2024)    Received from [facility]    Transportation Needs     In the past 12 months, has lack of reliable  transportation kept you from medical appointments, meetings, work or from getting things needed for daily living? : No   Physical Activity: Medium Risk (8/1/2024)    Received from [facility]    Exercise Vital Sign     On average, how many days per week do you engage in moderate to strenuous exercise (like a brisk walk)?: 2 days     On average, how many minutes do you engage in exercise at this level?: 30 min   Stress: Low Risk  (8/1/2024)    Received from [facility]    Stress     Stress is when someone feels tense, nervous, anxious, or can't sleep at night because their mind is troubled. How stressed are you? : A little bit   Social Connections: Low Risk  (8/1/2024)    Received from [facility]    Social Connections     How often do you see or talk to people that you care about and feel close to? (For example: talking to friends on the phone, visiting friends or family, going to [employer] or club meetings): 3 to 5 times a week   Housing Stability: Low Risk  (3/13/2024)    Housing Stability     Housing Instability: No              Review of Systems    Positive for stated complaint: sob  Other systems are as noted in HPI.  Constitutional and vital signs reviewed.      All other systems reviewed and negative except as noted above.    Physical Exam     ED Triage Vitals [11/17/24 1039]   /73   Pulse 109   Resp 16   Temp 99.8 °F (37.7 °C)   Temp src Oral   SpO2 90 %   O2 Device None (Room air)       Current Vitals:   Vital Signs  BP: 125/73  Pulse: 90  Resp: 20  Temp: 99.8 °F (37.7 °C)  Temp src: Oral    Oxygen Therapy  SpO2: 95 %  O2 Device: None (Room air)        Physical Exam  Vitals and nursing note reviewed.   Constitutional:       Appearance: She is well-developed. She is obese. She is not ill-appearing, toxic-appearing or diaphoretic.   HENT:      Mouth/Throat:      Pharynx: No pharyngeal swelling.   Eyes:      Pupils: Pupils are equal, round, and reactive to light.    Cardiovascular:      Rate and Rhythm: Normal rate and regular rhythm.   Pulmonary:      Effort: Tachypnea present.      Breath sounds: Examination of the right-upper field reveals wheezing. Wheezing present. No rales.   Neurological:      Mental Status: She is alert.           ED Course     Labs Reviewed   POCT FLU TEST - Normal    Narrative:     This assay is a rapid molecular in vitro test utilizing nucleic acid amplification of influenza A and B viral RNA.   RAPID SARS-COV-2 BY PCR - Normal   XR CHEST PA + LAT CHEST (CPT=71046)    Result Date: 11/17/2024  CONCLUSION:  No acute cardiopulmonary findings.   LOCATION:  [facility]   Dictated by (CST): Deejay Horn MD on 11/17/2024 at 12:05 PM     Finalized by (CST): Deejay Horn MD on 11/17/2024 at 12:06 PM             [facility]          Medical Decision Making  Differentials include but are not limited to viral URI, asthma exacerbation and pneumonia.  Upon arrival, O2 saturation 90% on room air and patient is tachypneic.  Prednisone 60 mg p.o. and DuoNeb with subjective improvement. Negative rapid COVID and flu testing.  Respirations even unlabored after prednisone and 2 nebulizer treatments.  O2 saturations range from 90 to 96%.  Will plan for additional 4 days of prednisone and patient to utilize albuterol at home as needed.  Strict ER precautions.  Close outpatient follow-up with pulmonology to ensure improvement with plan of care.  Patient agrees with plan of care.  All questions answered to patient's satisfaction.    Amount and/or Complexity of Data Reviewed  Labs: ordered. Decision-making details documented in ED Course.  Radiology: ordered and independent interpretation performed. Decision-making details documented in ED Course.        Disposition and Plan     Clinical Impression:  1. Moderate persistent asthma with exacerbation (HCC)    2. Viral URI with cough         Disposition:  Discharge  11/17/2024 12:32 pm    Follow-up:  Swati Gallego  [address]  [facility] [address]  [phone]    In 3 days            Medications Prescribed:  Discharge Medication List as of 11/17/2024 12:37 PM        START taking these medications    Details   !! predniSONE 20 MG Oral Tab Take 3 tablets (60 mg total) by mouth daily for 4 days., Normal, Disp-12 tablet, R-0      albuterol 108 (90 Base) MCG/ACT Inhalation Aero Soln Inhale 2 puffs into the lungs every 4 (four) hours as needed for Wheezing., Normal, Disp-1 each, R-0      albuterol (2.5 MG/3ML) 0.083% Inhalation Nebu Soln Take 3 mL (2.5 mg total) by nebulization every 4 (four) hours as needed for Wheezing or Shortness of Breath., Normal, Disp-30 each, R-0       !! - Potential duplicate medications found. Please discuss with provider.              Supplementary Documentation:

## 2024-11-18 RX ORDER — METHYLPREDNISOLONE 4 MG/1
4 TABLET ORAL SEE ADMIN INSTRUCTIONS
Qty: 21 TABLET | Refills: 0 | Status: SHIPPED | OUTPATIENT
Start: 2024-11-18

## 2024-11-18 RX ORDER — MONTELUKAST SODIUM 10 MG/1
TABLET ORAL
Qty: 90 TABLET | Refills: 1 | Status: SHIPPED | OUTPATIENT
Start: 2024-11-18

## 2024-11-26 ENCOUNTER — E-ADVICE (OUTPATIENT)
Dept: INTERNAL MEDICINE | Age: 59
End: 2024-11-26

## 2025-02-25 ENCOUNTER — APPOINTMENT (OUTPATIENT)
Dept: INTERNAL MEDICINE | Age: 60
End: 2025-02-25

## 2025-02-25 ENCOUNTER — LAB SERVICES (OUTPATIENT)
Dept: LAB | Age: 60
End: 2025-02-25

## 2025-02-25 VITALS
DIASTOLIC BLOOD PRESSURE: 85 MMHG | SYSTOLIC BLOOD PRESSURE: 134 MMHG | BODY MASS INDEX: 42.74 KG/M2 | OXYGEN SATURATION: 100 % | RESPIRATION RATE: 16 BRPM | HEART RATE: 78 BPM | WEIGHT: 250.33 LBS | HEIGHT: 64 IN

## 2025-02-25 DIAGNOSIS — E66.01 TYPE 2 DIABETES MELLITUS WITH MORBID OBESITY (CMD): ICD-10-CM

## 2025-02-25 DIAGNOSIS — E55.9 VITAMIN D DEFICIENCY DISEASE: ICD-10-CM

## 2025-02-25 DIAGNOSIS — I10 ESSENTIAL HYPERTENSION: ICD-10-CM

## 2025-02-25 DIAGNOSIS — Z11.59 NEED FOR HEPATITIS C SCREENING TEST: ICD-10-CM

## 2025-02-25 DIAGNOSIS — E11.69 TYPE 2 DIABETES MELLITUS WITH MORBID OBESITY (CMD): ICD-10-CM

## 2025-02-25 DIAGNOSIS — J45.909 UNCOMPLICATED ASTHMA, UNSPECIFIED ASTHMA SEVERITY, UNSPECIFIED WHETHER PERSISTENT (CMD): ICD-10-CM

## 2025-02-25 DIAGNOSIS — Z00.00 ENCOUNTER FOR GENERAL ADULT MEDICAL EXAMINATION W/O ABNORMAL FINDINGS: ICD-10-CM

## 2025-02-25 DIAGNOSIS — I10 ESSENTIAL HYPERTENSION: Primary | ICD-10-CM

## 2025-02-25 DIAGNOSIS — E11.69 TYPE 2 DIABETES MELLITUS WITH MORBID OBESITY  (CMD): ICD-10-CM

## 2025-02-25 DIAGNOSIS — J42 CHRONIC BRONCHITIS, UNSPECIFIED CHRONIC BRONCHITIS TYPE  (CMD): ICD-10-CM

## 2025-02-25 DIAGNOSIS — E66.01 TYPE 2 DIABETES MELLITUS WITH MORBID OBESITY  (CMD): ICD-10-CM

## 2025-02-25 DIAGNOSIS — Z12.31 SCREENING MAMMOGRAM FOR BREAST CANCER: ICD-10-CM

## 2025-02-25 LAB
25(OH)D3+25(OH)D2 SERPL-MCNC: 39.6 NG/ML (ref 30–100)
ALBUMIN SERPL-MCNC: 3.8 G/DL (ref 3.4–5)
ALBUMIN/GLOB SERPL: 1.2 {RATIO} (ref 1–2.4)
ALP SERPL-CCNC: 120 UNITS/L (ref 45–117)
ALT SERPL-CCNC: 25 UNITS/L
ANION GAP SERPL CALC-SCNC: 8 MMOL/L (ref 7–19)
AST SERPL-CCNC: 16 UNITS/L
BILIRUB SERPL-MCNC: 0.3 MG/DL (ref 0.2–1)
BUN SERPL-MCNC: 15 MG/DL (ref 6–20)
BUN/CREAT SERPL: 21 (ref 7–25)
CALCIUM SERPL-MCNC: 9.5 MG/DL (ref 8.4–10.2)
CHLORIDE SERPL-SCNC: 104 MMOL/L (ref 97–110)
CHOLEST SERPL-MCNC: 150 MG/DL
CHOLEST/HDLC SERPL: 2.1 {RATIO}
CO2 SERPL-SCNC: 30 MMOL/L (ref 21–32)
CREAT SERPL-MCNC: 0.7 MG/DL (ref 0.51–0.95)
EGFRCR SERPLBLD CKD-EPI 2021: >90 ML/MIN/{1.73_M2}
FASTING DURATION TIME PATIENT: 10 HOURS (ref 0–999)
GLOBULIN SER-MCNC: 3.1 G/DL (ref 2–4)
GLUCOSE SERPL-MCNC: 114 MG/DL (ref 70–99)
HDLC SERPL-MCNC: 70 MG/DL
LDLC SERPL CALC-MCNC: 64 MG/DL
NONHDLC SERPL-MCNC: 80 MG/DL
POTASSIUM SERPL-SCNC: 4.4 MMOL/L (ref 3.4–5.1)
PROT SERPL-MCNC: 6.9 G/DL (ref 6.4–8.2)
SODIUM SERPL-SCNC: 138 MMOL/L (ref 135–145)
TRIGL SERPL-MCNC: 79 MG/DL
TSH SERPL-ACNC: 1.85 MCUNITS/ML (ref 0.35–5)

## 2025-02-25 PROCEDURE — 80050 GENERAL HEALTH PANEL: CPT | Performed by: CLINICAL MEDICAL LABORATORY

## 2025-02-25 PROCEDURE — 82570 ASSAY OF URINE CREATININE: CPT | Performed by: CLINICAL MEDICAL LABORATORY

## 2025-02-25 PROCEDURE — 86803 HEPATITIS C AB TEST: CPT | Performed by: CLINICAL MEDICAL LABORATORY

## 2025-02-25 PROCEDURE — 3079F DIAST BP 80-89 MM HG: CPT | Performed by: INTERNAL MEDICINE

## 2025-02-25 PROCEDURE — 82043 UR ALBUMIN QUANTITATIVE: CPT | Performed by: CLINICAL MEDICAL LABORATORY

## 2025-02-25 PROCEDURE — 3075F SYST BP GE 130 - 139MM HG: CPT | Performed by: INTERNAL MEDICINE

## 2025-02-25 PROCEDURE — 83036 HEMOGLOBIN GLYCOSYLATED A1C: CPT | Performed by: CLINICAL MEDICAL LABORATORY

## 2025-02-25 PROCEDURE — 99396 PREV VISIT EST AGE 40-64: CPT | Performed by: INTERNAL MEDICINE

## 2025-02-25 PROCEDURE — 36415 COLL VENOUS BLD VENIPUNCTURE: CPT | Performed by: INTERNAL MEDICINE

## 2025-02-25 PROCEDURE — 82306 VITAMIN D 25 HYDROXY: CPT | Performed by: CLINICAL MEDICAL LABORATORY

## 2025-02-25 PROCEDURE — 80061 LIPID PANEL: CPT | Performed by: CLINICAL MEDICAL LABORATORY

## 2025-02-25 RX ORDER — SEMAGLUTIDE 0.68 MG/ML
0.5 INJECTION, SOLUTION SUBCUTANEOUS
Qty: 3 ML | Refills: 1 | Status: SHIPPED | OUTPATIENT
Start: 2025-02-25

## 2025-02-25 RX ORDER — LOSARTAN POTASSIUM AND HYDROCHLOROTHIAZIDE 12.5; 5 MG/1; MG/1
1 TABLET ORAL DAILY
Qty: 90 TABLET | Refills: 1 | Status: SHIPPED | OUTPATIENT
Start: 2025-02-25

## 2025-02-25 RX ORDER — CELECOXIB 200 MG/1
200 CAPSULE ORAL DAILY
Qty: 90 CAPSULE | Refills: 1 | Status: SHIPPED | OUTPATIENT
Start: 2025-02-25

## 2025-02-25 RX ORDER — ROSUVASTATIN CALCIUM 5 MG/1
5 TABLET, COATED ORAL DAILY
Qty: 90 TABLET | Refills: 3 | Status: SHIPPED | OUTPATIENT
Start: 2025-02-25

## 2025-02-25 ASSESSMENT — ENCOUNTER SYMPTOMS
HEMATOLOGIC/LYMPHATIC NEGATIVE: 1
RESPIRATORY NEGATIVE: 1
PSYCHIATRIC NEGATIVE: 1
NEUROLOGICAL NEGATIVE: 1
FATIGUE: 1
GASTROINTESTINAL NEGATIVE: 1
ENDOCRINE NEGATIVE: 1
EYES NEGATIVE: 1
ALLERGIC/IMMUNOLOGIC NEGATIVE: 1

## 2025-02-25 ASSESSMENT — PATIENT HEALTH QUESTIONNAIRE - PHQ9
SUM OF ALL RESPONSES TO PHQ9 QUESTIONS 1 AND 2: 0
2. FEELING DOWN, DEPRESSED OR HOPELESS: NOT AT ALL
CLINICAL INTERPRETATION OF PHQ2 SCORE: NO FURTHER SCREENING NEEDED
1. LITTLE INTEREST OR PLEASURE IN DOING THINGS: NOT AT ALL
SUM OF ALL RESPONSES TO PHQ9 QUESTIONS 1 AND 2: 0

## 2025-02-26 ENCOUNTER — E-ADVICE (OUTPATIENT)
Dept: INTERNAL MEDICINE | Age: 60
End: 2025-02-26

## 2025-02-26 LAB
BASOPHILS # BLD: 0.1 K/MCL (ref 0–0.3)
BASOPHILS NFR BLD: 1 %
CREAT UR-MCNC: 28 MG/DL
DEPRECATED RDW RBC: 44.2 FL (ref 39–50)
EOSINOPHIL # BLD: 0.2 K/MCL (ref 0–0.5)
EOSINOPHIL NFR BLD: 3 %
ERYTHROCYTE [DISTWIDTH] IN BLOOD: 12.9 % (ref 11–15)
HBA1C MFR BLD: 6.6 % (ref 4.5–5.6)
HCT VFR BLD CALC: 44.1 % (ref 36–46.5)
HCV AB SER QL: NEGATIVE
HGB BLD-MCNC: 13.8 G/DL (ref 12–15.5)
IMM GRANULOCYTES # BLD AUTO: 0 K/MCL (ref 0–0.2)
IMM GRANULOCYTES # BLD: 0 %
LYMPHOCYTES # BLD: 2.3 K/MCL (ref 1–4)
LYMPHOCYTES NFR BLD: 28 %
MCH RBC QN AUTO: 29.2 PG (ref 26–34)
MCHC RBC AUTO-ENTMCNC: 31.3 G/DL (ref 32–36.5)
MCV RBC AUTO: 93.2 FL (ref 78–100)
MICROALBUMIN UR-MCNC: <0.5 MG/DL
MICROALBUMIN/CREAT UR: NORMAL MG/G{CREAT}
MONOCYTES # BLD: 0.4 K/MCL (ref 0.3–0.9)
MONOCYTES NFR BLD: 5 %
NEUTROPHILS # BLD: 5.2 K/MCL (ref 1.8–7.7)
NEUTROPHILS NFR BLD: 63 %
NRBC BLD MANUAL-RTO: 0 /100 WBC
PLATELET # BLD AUTO: 383 K/MCL (ref 140–450)
RBC # BLD: 4.73 MIL/MCL (ref 4–5.2)
WBC # BLD: 8.2 K/MCL (ref 4.2–11)

## 2025-03-05 ENCOUNTER — TELEPHONE (OUTPATIENT)
Dept: INTERNAL MEDICINE | Age: 60
End: 2025-03-05

## 2025-03-11 ENCOUNTER — HOSPITAL ENCOUNTER (OUTPATIENT)
Dept: MAMMOGRAPHY | Age: 60
Discharge: HOME | End: 2025-03-11

## 2025-03-11 DIAGNOSIS — Z12.31 SCREENING MAMMOGRAM FOR BREAST CANCER: ICD-10-CM

## 2025-03-11 PROCEDURE — 77067 SCR MAMMO BI INCL CAD: CPT

## 2025-03-12 ENCOUNTER — E-ADVICE (OUTPATIENT)
Dept: INTERNAL MEDICINE | Age: 60
End: 2025-03-12

## 2025-03-12 ENCOUNTER — CLINICAL DOCUMENTATION (OUTPATIENT)
Dept: CT IMAGING | Age: 60
End: 2025-03-12

## 2025-03-12 ENCOUNTER — TELEPHONE (OUTPATIENT)
Dept: CT IMAGING | Age: 60
End: 2025-03-12

## 2025-03-19 ENCOUNTER — TELEPHONE (OUTPATIENT)
Dept: CT IMAGING | Age: 60
End: 2025-03-19

## 2025-04-03 ENCOUNTER — CLINICAL DOCUMENTATION (OUTPATIENT)
Dept: CT IMAGING | Age: 60
End: 2025-04-03

## 2025-04-29 ENCOUNTER — CLINICAL DOCUMENTATION (OUTPATIENT)
Dept: CT IMAGING | Age: 60
End: 2025-04-29

## 2025-05-06 ENCOUNTER — E-ADVICE (OUTPATIENT)
Dept: INTERNAL MEDICINE | Age: 60
End: 2025-05-06

## 2025-05-07 ENCOUNTER — TELEPHONE (OUTPATIENT)
Dept: INTERNAL MEDICINE | Age: 60
End: 2025-05-07

## 2025-05-09 SDOH — ECONOMIC STABILITY: FOOD INSECURITY: WITHIN THE PAST 12 MONTHS, THE FOOD YOU BOUGHT JUST DIDN'T LAST AND YOU DIDN'T HAVE MONEY TO GET MORE.: NEVER TRUE

## 2025-05-09 SDOH — ECONOMIC STABILITY: HOUSING INSECURITY: WHAT IS YOUR LIVING SITUATION TODAY?: I HAVE A STEADY PLACE TO LIVE

## 2025-05-09 SDOH — ECONOMIC STABILITY: HOUSING INSECURITY: DO YOU HAVE PROBLEMS WITH ANY OF THE FOLLOWING?: NONE OF THE ABOVE

## 2025-05-09 ASSESSMENT — SOCIAL DETERMINANTS OF HEALTH (SDOH): IN THE PAST 12 MONTHS, HAS THE ELECTRIC, GAS, OIL, OR WATER COMPANY THREATENED TO SHUT OFF SERVICE IN YOUR HOME?: NO

## 2025-05-13 ENCOUNTER — APPOINTMENT (OUTPATIENT)
Dept: INTERNAL MEDICINE | Age: 60
End: 2025-05-13

## 2025-05-13 DIAGNOSIS — I10 ESSENTIAL HYPERTENSION: ICD-10-CM

## 2025-05-13 DIAGNOSIS — E66.01 TYPE 2 DIABETES MELLITUS WITH MORBID OBESITY (CMD): ICD-10-CM

## 2025-05-13 DIAGNOSIS — E11.69 TYPE 2 DIABETES MELLITUS WITH MORBID OBESITY (CMD): ICD-10-CM

## 2025-05-13 DIAGNOSIS — G89.29 OTHER CHRONIC PAIN: Primary | ICD-10-CM

## 2025-05-13 PROCEDURE — 99214 OFFICE O/P EST MOD 30 MIN: CPT | Performed by: INTERNAL MEDICINE

## 2025-05-13 RX ORDER — MONTELUKAST SODIUM 10 MG/1
10 TABLET ORAL DAILY
COMMUNITY
Start: 2025-03-28

## 2025-05-13 RX ORDER — HYDROCODONE BITARTRATE AND ACETAMINOPHEN 10; 325 MG/1; MG/1
1 TABLET ORAL EVERY 6 HOURS PRN
Qty: 40 TABLET | Refills: 0 | Status: SHIPPED | OUTPATIENT
Start: 2025-05-13

## 2025-05-15 ASSESSMENT — ENCOUNTER SYMPTOMS
HEMATOLOGIC/LYMPHATIC NEGATIVE: 1
FATIGUE: 1
EYES NEGATIVE: 1
RESPIRATORY NEGATIVE: 1
GASTROINTESTINAL NEGATIVE: 1
PSYCHIATRIC NEGATIVE: 1
ENDOCRINE NEGATIVE: 1
ALLERGIC/IMMUNOLOGIC NEGATIVE: 1

## 2025-05-22 RX ORDER — HYDROCODONE BITARTRATE AND ACETAMINOPHEN 10; 325 MG/1; MG/1
1 TABLET ORAL EVERY 6 HOURS PRN
COMMUNITY
End: 2025-06-19

## 2025-05-22 RX ORDER — EPINEPHRINE 0.3 MG/.3ML
1 INJECTION SUBCUTANEOUS AS NEEDED
COMMUNITY

## 2025-05-22 RX ORDER — ALPRAZOLAM 0.5 MG
1 TABLET ORAL NIGHTLY PRN
Status: ON HOLD | COMMUNITY
Start: 2023-11-02 | End: 2025-06-18 | Stop reason: CLARIF

## 2025-05-22 RX ORDER — ROSUVASTATIN CALCIUM 5 MG/1
5 TABLET, COATED ORAL DAILY
COMMUNITY

## 2025-05-22 RX ORDER — CELECOXIB 200 MG/1
200 CAPSULE ORAL DAILY
COMMUNITY
End: 2025-06-19

## 2025-05-22 RX ORDER — ALBUTEROL SULFATE 90 UG/1
2 INHALANT RESPIRATORY (INHALATION) EVERY 4 HOURS PRN
COMMUNITY

## 2025-05-22 RX ORDER — MONTELUKAST SODIUM 10 MG/1
10 TABLET ORAL NIGHTLY
COMMUNITY

## 2025-05-22 RX ORDER — DICLOFENAC SODIUM 75 MG/1
75 TABLET, DELAYED RELEASE ORAL 2 TIMES DAILY
Status: ON HOLD | COMMUNITY
Start: 2023-06-26 | End: 2025-06-18 | Stop reason: CLARIF

## 2025-05-23 ENCOUNTER — CLINICAL DOCUMENTATION (OUTPATIENT)
Dept: CT IMAGING | Age: 60
End: 2025-05-23

## 2025-06-02 ENCOUNTER — TELEPHONE (OUTPATIENT)
Dept: FAMILY MEDICINE | Age: 60
End: 2025-06-02

## 2025-06-03 ENCOUNTER — LAB ENCOUNTER (OUTPATIENT)
Dept: LAB | Age: 60
End: 2025-06-03
Attending: ORTHOPAEDIC SURGERY
Payer: COMMERCIAL

## 2025-06-03 DIAGNOSIS — Z01.818 PRE-OP TESTING: ICD-10-CM

## 2025-06-03 LAB
ANTIBODY SCREEN: NEGATIVE
RH BLOOD TYPE: POSITIVE

## 2025-06-03 PROCEDURE — 86850 RBC ANTIBODY SCREEN: CPT

## 2025-06-03 PROCEDURE — 86900 BLOOD TYPING SEROLOGIC ABO: CPT

## 2025-06-03 PROCEDURE — 87081 CULTURE SCREEN ONLY: CPT

## 2025-06-03 PROCEDURE — 86901 BLOOD TYPING SEROLOGIC RH(D): CPT

## 2025-06-09 ENCOUNTER — E-ADVICE (OUTPATIENT)
Dept: INTERNAL MEDICINE | Age: 60
End: 2025-06-09

## 2025-06-10 ENCOUNTER — APPOINTMENT (OUTPATIENT)
Dept: INTERNAL MEDICINE | Age: 60
End: 2025-06-10

## 2025-06-10 ENCOUNTER — LAB SERVICES (OUTPATIENT)
Dept: LAB | Age: 60
End: 2025-06-10

## 2025-06-10 VITALS
WEIGHT: 263.78 LBS | TEMPERATURE: 98 F | OXYGEN SATURATION: 96 % | RESPIRATION RATE: 16 BRPM | BODY MASS INDEX: 45.03 KG/M2 | SYSTOLIC BLOOD PRESSURE: 132 MMHG | HEART RATE: 93 BPM | HEIGHT: 64 IN | DIASTOLIC BLOOD PRESSURE: 75 MMHG

## 2025-06-10 DIAGNOSIS — M16.10 PRIMARY OSTEOARTHRITIS OF HIP, UNSPECIFIED LATERALITY: ICD-10-CM

## 2025-06-10 DIAGNOSIS — E11.9 TYPE 2 DIABETES MELLITUS WITHOUT COMPLICATION, WITHOUT LONG-TERM CURRENT USE OF INSULIN (CMD): Chronic | ICD-10-CM

## 2025-06-10 DIAGNOSIS — I10 ESSENTIAL HYPERTENSION: ICD-10-CM

## 2025-06-10 DIAGNOSIS — E11.69 TYPE 2 DIABETES MELLITUS WITH MORBID OBESITY (CMD): ICD-10-CM

## 2025-06-10 DIAGNOSIS — G89.29 OTHER CHRONIC PAIN: ICD-10-CM

## 2025-06-10 DIAGNOSIS — Z01.818 PREOP EXAMINATION: Primary | ICD-10-CM

## 2025-06-10 DIAGNOSIS — E66.01 TYPE 2 DIABETES MELLITUS WITH MORBID OBESITY (CMD): ICD-10-CM

## 2025-06-10 PROCEDURE — 83036 HEMOGLOBIN GLYCOSYLATED A1C: CPT | Performed by: CLINICAL MEDICAL LABORATORY

## 2025-06-10 PROCEDURE — 36415 COLL VENOUS BLD VENIPUNCTURE: CPT | Performed by: INTERNAL MEDICINE

## 2025-06-10 PROCEDURE — 85025 COMPLETE CBC W/AUTO DIFF WBC: CPT | Performed by: CLINICAL MEDICAL LABORATORY

## 2025-06-10 PROCEDURE — 80053 COMPREHEN METABOLIC PANEL: CPT | Performed by: CLINICAL MEDICAL LABORATORY

## 2025-06-10 RX ORDER — METFORMIN HYDROCHLORIDE 500 MG/1
500 TABLET, EXTENDED RELEASE ORAL
Qty: 90 TABLET | Refills: 3 | Status: SHIPPED | OUTPATIENT
Start: 2025-06-10

## 2025-06-10 ASSESSMENT — PATIENT HEALTH QUESTIONNAIRE - PHQ9
SUM OF ALL RESPONSES TO PHQ9 QUESTIONS 1 AND 2: 0
CLINICAL INTERPRETATION OF PHQ2 SCORE: NO FURTHER SCREENING NEEDED
1. LITTLE INTEREST OR PLEASURE IN DOING THINGS: NOT AT ALL
SUM OF ALL RESPONSES TO PHQ9 QUESTIONS 1 AND 2: 0
2. FEELING DOWN, DEPRESSED OR HOPELESS: NOT AT ALL

## 2025-06-10 ASSESSMENT — ENCOUNTER SYMPTOMS
ALLERGIC/IMMUNOLOGIC NEGATIVE: 1
CONSTITUTIONAL NEGATIVE: 1
PSYCHIATRIC NEGATIVE: 1
HEMATOLOGIC/LYMPHATIC NEGATIVE: 1
RESPIRATORY NEGATIVE: 1
BACK PAIN: 1
ENDOCRINE NEGATIVE: 1
GASTROINTESTINAL NEGATIVE: 1
EYES NEGATIVE: 1

## 2025-06-11 LAB
ALBUMIN SERPL-MCNC: 3.8 G/DL (ref 3.4–5)
ALBUMIN/GLOB SERPL: 1.3 {RATIO} (ref 1–2.4)
ALP SERPL-CCNC: 116 UNITS/L (ref 45–117)
ALT SERPL-CCNC: 34 UNITS/L
ANION GAP SERPL CALC-SCNC: 7 MMOL/L (ref 7–19)
AST SERPL-CCNC: 17 UNITS/L
BASOPHILS # BLD: 0.1 K/MCL (ref 0–0.3)
BASOPHILS NFR BLD: 1 %
BILIRUB SERPL-MCNC: 0.2 MG/DL (ref 0.2–1)
BUN SERPL-MCNC: 13 MG/DL (ref 6–20)
BUN/CREAT SERPL: 20 (ref 7–25)
CALCIUM SERPL-MCNC: 9.3 MG/DL (ref 8.4–10.2)
CHLORIDE SERPL-SCNC: 109 MMOL/L (ref 97–110)
CO2 SERPL-SCNC: 30 MMOL/L (ref 21–32)
CREAT SERPL-MCNC: 0.65 MG/DL (ref 0.51–0.95)
DEPRECATED RDW RBC: 42.8 FL (ref 39–50)
EGFRCR SERPLBLD CKD-EPI 2021: >90 ML/MIN/{1.73_M2}
EOSINOPHIL # BLD: 0.4 K/MCL (ref 0–0.5)
EOSINOPHIL NFR BLD: 5 %
ERYTHROCYTE [DISTWIDTH] IN BLOOD: 12.6 % (ref 11–15)
FASTING DURATION TIME PATIENT: 0 HOURS (ref 0–999)
GLOBULIN SER-MCNC: 3 G/DL (ref 2–4)
GLUCOSE SERPL-MCNC: 188 MG/DL (ref 70–99)
HBA1C MFR BLD: 6.5 % (ref 4.5–5.6)
HCT VFR BLD CALC: 42.8 % (ref 36–46.5)
HGB BLD-MCNC: 14.1 G/DL (ref 12–15.5)
IMM GRANULOCYTES # BLD AUTO: 0 K/MCL (ref 0–0.2)
IMM GRANULOCYTES # BLD: 0 %
LYMPHOCYTES # BLD: 2.4 K/MCL (ref 1–4)
LYMPHOCYTES NFR BLD: 31 %
MCH RBC QN AUTO: 30.5 PG (ref 26–34)
MCHC RBC AUTO-ENTMCNC: 32.9 G/DL (ref 32–36.5)
MCV RBC AUTO: 92.6 FL (ref 78–100)
MONOCYTES # BLD: 0.4 K/MCL (ref 0.3–0.9)
MONOCYTES NFR BLD: 5 %
NEUTROPHILS # BLD: 4.5 K/MCL (ref 1.8–7.7)
NEUTROPHILS NFR BLD: 58 %
NRBC BLD MANUAL-RTO: 0 /100 WBC
PLATELET # BLD AUTO: 377 K/MCL (ref 140–450)
POTASSIUM SERPL-SCNC: 4.3 MMOL/L (ref 3.4–5.1)
PROT SERPL-MCNC: 6.8 G/DL (ref 6.4–8.2)
RBC # BLD: 4.62 MIL/MCL (ref 4–5.2)
SODIUM SERPL-SCNC: 142 MMOL/L (ref 135–145)
WBC # BLD: 7.8 K/MCL (ref 4.2–11)

## 2025-06-12 ENCOUNTER — TELEPHONE (OUTPATIENT)
Dept: INTERNAL MEDICINE | Age: 60
End: 2025-06-12

## 2025-06-12 ENCOUNTER — RESULTS FOLLOW-UP (OUTPATIENT)
Dept: INTERNAL MEDICINE | Age: 60
End: 2025-06-12

## 2025-06-13 ENCOUNTER — TELEPHONE (OUTPATIENT)
Dept: FAMILY MEDICINE | Age: 60
End: 2025-06-13

## 2025-06-13 ENCOUNTER — ANESTHESIA EVENT (OUTPATIENT)
Dept: SURGERY | Facility: HOSPITAL | Age: 60
End: 2025-06-13
Payer: COMMERCIAL

## 2025-06-13 NOTE — H&P
[facility]  History & Physical    Kaylee Shaikh Patient Status:  Outpatient in a Bed    8/10/1965 MRN [id number]   Location [facility] SURGERY Attending Carlo Allen MD   Hosp Day # 0 PCP Swati Gallego MD     Date of Admission:  (Not on file)    History of Present Illness:  Kaylee Shaikh is a(n) 59 year old female.  The patient has failed conservative treatment for right hip osteoarthritis and has significant limitations in ADL's including ambulation and exercise, and presents for total joint arthroplasty at this time.    History:  Past Medical History[1]  Past Surgical History[2]  Family History[3]   reports that she has been smoking cigarettes. She has never used smokeless tobacco. She reports current alcohol use. She reports current drug use. Drug: Cannabis.    Allergies:  Allergies[4]    Home Medications:  Prescriptions Prior to Admission[5]    Physical Exam:   General: Alert, orientated x3.  Cooperative.  No apparent distress.  Vital Signs:  Height 5' 4\" (1.626 m), weight 250 lb (113.4 kg), last menstrual period 2017.  HEENT: Exam is unremarkable.    Neck: No tenderness to palpitation. No JVD. Supple.   Lungs: Clear to auscultation bilaterally.  Cardiac: Regular rate and rhythm. No murmur.  Abdomen:  Soft, non-distended, non-tender, with no rebound or guarding.   Extremities:  No lower extremity edema noted.  Without clubbing or cyanosis.    The right hip has pain with motion  There is limitation of rotation of the right hip with pain at the extremes    Laboratory Data:  xrays show severe osteoarthritis of the right hip    Impression and Plan:  Problem List[6]    Plan is for non-cemented right total hip arthroplasty        Carlo Allen MD  2025  11:05 AM          [1]   Past Medical History:   Anxiety state    Asthma (HCC)    CPAP (continuous positive airway pressure) dependence    Depression    Diabetes (HCC)    High blood pressure    High cholesterol    Pneumonia    Visual  impairment   [2]   Past Surgical History:  Procedure Laterality Date    Needle biopsy left      early 20's    Removal salivary stone,uncomplic     [3] History reviewed. No pertinent family history.  [4] No Known Allergies  [5]   No medications prior to admission.   [6]   Patient Active Problem List  Diagnosis    Hyperglycemia    Influenza A    Type 2 diabetes mellitus without complication, without long-term current use of insulin (HCC)    Exacerbation of asthma (HCC)    Primary hypertension    Severe persistent asthma with exacerbation (HCC)    Hypoxia    Hyponatremia

## 2025-06-16 ENCOUNTER — TELEPHONE (OUTPATIENT)
Dept: INTERNAL MEDICINE | Age: 60
End: 2025-06-16

## 2025-06-18 ENCOUNTER — CLINICAL DOCUMENTATION (OUTPATIENT)
Dept: CT IMAGING | Age: 60
End: 2025-06-18

## 2025-06-18 ENCOUNTER — HOSPITAL ENCOUNTER (OUTPATIENT)
Facility: HOSPITAL | Age: 60
Discharge: HOME HEALTH | End: 2025-06-19
Attending: ORTHOPAEDIC SURGERY | Admitting: ORTHOPAEDIC SURGERY
Payer: COMMERCIAL

## 2025-06-18 ENCOUNTER — APPOINTMENT (OUTPATIENT)
Dept: GENERAL RADIOLOGY | Facility: HOSPITAL | Age: 60
End: 2025-06-18
Attending: ORTHOPAEDIC SURGERY
Payer: COMMERCIAL

## 2025-06-18 ENCOUNTER — ANESTHESIA (OUTPATIENT)
Dept: SURGERY | Facility: HOSPITAL | Age: 60
End: 2025-06-18
Payer: COMMERCIAL

## 2025-06-18 DIAGNOSIS — Z01.818 PRE-OP TESTING: Primary | ICD-10-CM

## 2025-06-18 LAB
GLUCOSE BLD-MCNC: 122 MG/DL (ref 70–99)
GLUCOSE BLD-MCNC: 177 MG/DL (ref 70–99)
GLUCOSE BLD-MCNC: 188 MG/DL (ref 70–99)
RH BLOOD TYPE: POSITIVE

## 2025-06-18 PROCEDURE — 97530 THERAPEUTIC ACTIVITIES: CPT

## 2025-06-18 PROCEDURE — 88304 TISSUE EXAM BY PATHOLOGIST: CPT | Performed by: ORTHOPAEDIC SURGERY

## 2025-06-18 PROCEDURE — 97161 PT EVAL LOW COMPLEX 20 MIN: CPT

## 2025-06-18 PROCEDURE — 73501 X-RAY EXAM HIP UNI 1 VIEW: CPT | Performed by: ORTHOPAEDIC SURGERY

## 2025-06-18 PROCEDURE — 76942 ECHO GUIDE FOR BIOPSY: CPT | Performed by: STUDENT IN AN ORGANIZED HEALTH CARE EDUCATION/TRAINING PROGRAM

## 2025-06-18 PROCEDURE — 82962 GLUCOSE BLOOD TEST: CPT

## 2025-06-18 PROCEDURE — 88311 DECALCIFY TISSUE: CPT | Performed by: ORTHOPAEDIC SURGERY

## 2025-06-18 DEVICE — EMPHASYS ACETABULAR SHELL THREE-HOLE 50MM CEMENTLESS
Type: IMPLANTABLE DEVICE | Site: HIP | Status: FUNCTIONAL
Brand: EMPHASYS

## 2025-06-18 DEVICE — CORAIL HIP SYSTEM CEMENTLESS FEMORAL STEM 12/14 AMT 135 DEGREES KA SIZE 9 HA COATED STANDARD COLLAR
Type: IMPLANTABLE DEVICE | Site: HIP | Status: FUNCTIONAL
Brand: CORAIL

## 2025-06-18 DEVICE — BIOLOX DELTA CERAMIC FEMORAL HEAD +1.5 36MM DIA 12/14 TAPER
Type: IMPLANTABLE DEVICE | Site: HIP | Status: FUNCTIONAL
Brand: BIOLOX DELTA

## 2025-06-18 DEVICE — PINNACLE CANCELLOUS BONE SCREW 6.5MM X 25MM
Type: IMPLANTABLE DEVICE | Site: HIP | Status: FUNCTIONAL
Brand: PINNACLE

## 2025-06-18 DEVICE — EMPHASYS POLYETHYLENE LINER AOX NEUTRAL 50MM 36MM
Type: IMPLANTABLE DEVICE | Site: HIP | Status: FUNCTIONAL
Brand: EMPHASYS

## 2025-06-18 RX ORDER — DEXAMETHASONE SODIUM PHOSPHATE 4 MG/ML
VIAL (ML) INJECTION AS NEEDED
Status: DISCONTINUED | OUTPATIENT
Start: 2025-06-18 | End: 2025-06-18 | Stop reason: SURG

## 2025-06-18 RX ORDER — SODIUM PHOSPHATE, DIBASIC AND SODIUM PHOSPHATE, MONOBASIC 7; 19 G/230ML; G/230ML
1 ENEMA RECTAL ONCE AS NEEDED
Status: DISCONTINUED | OUTPATIENT
Start: 2025-06-18 | End: 2025-06-19

## 2025-06-18 RX ORDER — PROCHLORPERAZINE EDISYLATE 5 MG/ML
5 INJECTION INTRAMUSCULAR; INTRAVENOUS EVERY 8 HOURS PRN
Status: DISCONTINUED | OUTPATIENT
Start: 2025-06-18 | End: 2025-06-18 | Stop reason: HOSPADM

## 2025-06-18 RX ORDER — BUPIVACAINE HYDROCHLORIDE 2.5 MG/ML
INJECTION, SOLUTION EPIDURAL; INFILTRATION; INTRACAUDAL; PERINEURAL AS NEEDED
Status: DISCONTINUED | OUTPATIENT
Start: 2025-06-18 | End: 2025-06-18 | Stop reason: SURG

## 2025-06-18 RX ORDER — DOCUSATE SODIUM 100 MG/1
100 CAPSULE, LIQUID FILLED ORAL 2 TIMES DAILY
Status: DISCONTINUED | OUTPATIENT
Start: 2025-06-18 | End: 2025-06-19

## 2025-06-18 RX ORDER — POLYETHYLENE GLYCOL 3350 17 G/17G
17 POWDER, FOR SOLUTION ORAL DAILY PRN
Status: DISCONTINUED | OUTPATIENT
Start: 2025-06-18 | End: 2025-06-19

## 2025-06-18 RX ORDER — HYDROMORPHONE HYDROCHLORIDE 1 MG/ML
INJECTION, SOLUTION INTRAMUSCULAR; INTRAVENOUS; SUBCUTANEOUS
Status: COMPLETED
Start: 2025-06-18 | End: 2025-06-18

## 2025-06-18 RX ORDER — DIPHENHYDRAMINE HCL 25 MG
25 CAPSULE ORAL EVERY 4 HOURS PRN
Status: DISCONTINUED | OUTPATIENT
Start: 2025-06-18 | End: 2025-06-19

## 2025-06-18 RX ORDER — MIDAZOLAM HYDROCHLORIDE 1 MG/ML
INJECTION INTRAMUSCULAR; INTRAVENOUS AS NEEDED
Status: DISCONTINUED | OUTPATIENT
Start: 2025-06-18 | End: 2025-06-18 | Stop reason: SURG

## 2025-06-18 RX ORDER — TRANEXAMIC ACID 10 MG/ML
1000 INJECTION, SOLUTION INTRAVENOUS ONCE
Status: DISCONTINUED | OUTPATIENT
Start: 2025-06-18 | End: 2025-06-18 | Stop reason: HOSPADM

## 2025-06-18 RX ORDER — OXYCODONE HYDROCHLORIDE 5 MG/1
5 TABLET ORAL EVERY 4 HOURS PRN
Qty: 60 TABLET | Refills: 0 | Status: SHIPPED | OUTPATIENT
Start: 2025-06-18

## 2025-06-18 RX ORDER — NICOTINE POLACRILEX 4 MG
30 LOZENGE BUCCAL
Status: DISCONTINUED | OUTPATIENT
Start: 2025-06-18 | End: 2025-06-19

## 2025-06-18 RX ORDER — SODIUM CHLORIDE, SODIUM LACTATE, POTASSIUM CHLORIDE, CALCIUM CHLORIDE 600; 310; 30; 20 MG/100ML; MG/100ML; MG/100ML; MG/100ML
INJECTION, SOLUTION INTRAVENOUS CONTINUOUS
Status: DISCONTINUED | OUTPATIENT
Start: 2025-06-18 | End: 2025-06-19

## 2025-06-18 RX ORDER — ACETAMINOPHEN 325 MG/1
650 TABLET ORAL ONCE
Status: COMPLETED | OUTPATIENT
Start: 2025-06-18 | End: 2025-06-18

## 2025-06-18 RX ORDER — MONTELUKAST SODIUM 10 MG/1
10 TABLET ORAL NIGHTLY
Status: DISCONTINUED | OUTPATIENT
Start: 2025-06-19 | End: 2025-06-19

## 2025-06-18 RX ORDER — ASPIRIN 81 MG/1
81 TABLET ORAL 2 TIMES DAILY
Status: DISCONTINUED | OUTPATIENT
Start: 2025-06-18 | End: 2025-06-19

## 2025-06-18 RX ORDER — DOXEPIN HYDROCHLORIDE 50 MG/1
1 CAPSULE ORAL DAILY
COMMUNITY

## 2025-06-18 RX ORDER — HYDROMORPHONE HYDROCHLORIDE 1 MG/ML
0.6 INJECTION, SOLUTION INTRAMUSCULAR; INTRAVENOUS; SUBCUTANEOUS EVERY 5 MIN PRN
Status: DISCONTINUED | OUTPATIENT
Start: 2025-06-18 | End: 2025-06-18 | Stop reason: HOSPADM

## 2025-06-18 RX ORDER — NICOTINE POLACRILEX 4 MG
15 LOZENGE BUCCAL
Status: DISCONTINUED | OUTPATIENT
Start: 2025-06-18 | End: 2025-06-18 | Stop reason: HOSPADM

## 2025-06-18 RX ORDER — ONDANSETRON 2 MG/ML
4 INJECTION INTRAMUSCULAR; INTRAVENOUS EVERY 6 HOURS PRN
Status: DISCONTINUED | OUTPATIENT
Start: 2025-06-18 | End: 2025-06-19

## 2025-06-18 RX ORDER — DEXTROSE MONOHYDRATE 25 G/50ML
50 INJECTION, SOLUTION INTRAVENOUS
Status: DISCONTINUED | OUTPATIENT
Start: 2025-06-18 | End: 2025-06-19

## 2025-06-18 RX ORDER — TRANEXAMIC ACID 10 MG/ML
1000 INJECTION, SOLUTION INTRAVENOUS ONCE
Status: COMPLETED | OUTPATIENT
Start: 2025-06-18 | End: 2025-06-18

## 2025-06-18 RX ORDER — ASPIRIN 81 MG/1
81 TABLET ORAL 2 TIMES DAILY
Qty: 56 TABLET | Refills: 0 | Status: SHIPPED | OUTPATIENT
Start: 2025-06-18

## 2025-06-18 RX ORDER — HYDROMORPHONE HYDROCHLORIDE 1 MG/ML
0.4 INJECTION, SOLUTION INTRAMUSCULAR; INTRAVENOUS; SUBCUTANEOUS EVERY 5 MIN PRN
Status: DISCONTINUED | OUTPATIENT
Start: 2025-06-18 | End: 2025-06-18 | Stop reason: HOSPADM

## 2025-06-18 RX ORDER — KETOROLAC TROMETHAMINE 30 MG/ML
INJECTION, SOLUTION INTRAMUSCULAR; INTRAVENOUS AS NEEDED
Status: DISCONTINUED | OUTPATIENT
Start: 2025-06-18 | End: 2025-06-18 | Stop reason: SURG

## 2025-06-18 RX ORDER — ACETAMINOPHEN 325 MG/1
TABLET ORAL
Status: COMPLETED
Start: 2025-06-18 | End: 2025-06-18

## 2025-06-18 RX ORDER — DEXAMETHASONE SODIUM PHOSPHATE 10 MG/ML
8 INJECTION, SOLUTION INTRAMUSCULAR; INTRAVENOUS ONCE
Status: COMPLETED | OUTPATIENT
Start: 2025-06-19 | End: 2025-06-19

## 2025-06-18 RX ORDER — SODIUM CHLORIDE, SODIUM LACTATE, POTASSIUM CHLORIDE, CALCIUM CHLORIDE 600; 310; 30; 20 MG/100ML; MG/100ML; MG/100ML; MG/100ML
INJECTION, SOLUTION INTRAVENOUS CONTINUOUS
Status: DISCONTINUED | OUTPATIENT
Start: 2025-06-18 | End: 2025-06-18 | Stop reason: HOSPADM

## 2025-06-18 RX ORDER — ACETAMINOPHEN 500 MG
1000 TABLET ORAL ONCE
Status: DISCONTINUED | OUTPATIENT
Start: 2025-06-18 | End: 2025-06-18 | Stop reason: HOSPADM

## 2025-06-18 RX ORDER — ROSUVASTATIN CALCIUM 5 MG/1
5 TABLET, COATED ORAL NIGHTLY
Status: DISCONTINUED | OUTPATIENT
Start: 2025-06-18 | End: 2025-06-19

## 2025-06-18 RX ORDER — ACETAMINOPHEN 500 MG
1000 TABLET ORAL EVERY 8 HOURS SCHEDULED
Status: DISCONTINUED | OUTPATIENT
Start: 2025-06-18 | End: 2025-06-19

## 2025-06-18 RX ORDER — LABETALOL HYDROCHLORIDE 5 MG/ML
5 INJECTION, SOLUTION INTRAVENOUS EVERY 5 MIN PRN
Status: DISCONTINUED | OUTPATIENT
Start: 2025-06-18 | End: 2025-06-18 | Stop reason: HOSPADM

## 2025-06-18 RX ORDER — NALOXONE HYDROCHLORIDE 0.4 MG/ML
0.08 INJECTION, SOLUTION INTRAMUSCULAR; INTRAVENOUS; SUBCUTANEOUS AS NEEDED
Status: DISCONTINUED | OUTPATIENT
Start: 2025-06-18 | End: 2025-06-18 | Stop reason: HOSPADM

## 2025-06-18 RX ORDER — ALBUTEROL SULFATE 90 UG/1
2 INHALANT RESPIRATORY (INHALATION) EVERY 4 HOURS PRN
Status: DISCONTINUED | OUTPATIENT
Start: 2025-06-18 | End: 2025-06-19

## 2025-06-18 RX ORDER — SCOPOLAMINE 1 MG/3D
1 PATCH, EXTENDED RELEASE TRANSDERMAL ONCE
Status: DISCONTINUED | OUTPATIENT
Start: 2025-06-18 | End: 2025-06-18 | Stop reason: HOSPADM

## 2025-06-18 RX ORDER — TRANEXAMIC ACID 10 MG/ML
INJECTION, SOLUTION INTRAVENOUS
Status: COMPLETED
Start: 2025-06-18 | End: 2025-06-18

## 2025-06-18 RX ORDER — DEXTROSE MONOHYDRATE 25 G/50ML
50 INJECTION, SOLUTION INTRAVENOUS
Status: DISCONTINUED | OUTPATIENT
Start: 2025-06-18 | End: 2025-06-18 | Stop reason: HOSPADM

## 2025-06-18 RX ORDER — METFORMIN HYDROCHLORIDE 500 MG/1
500 TABLET, EXTENDED RELEASE ORAL
COMMUNITY

## 2025-06-18 RX ORDER — OXYCODONE HYDROCHLORIDE 10 MG/1
10 TABLET ORAL EVERY 4 HOURS PRN
Status: DISCONTINUED | OUTPATIENT
Start: 2025-06-18 | End: 2025-06-19

## 2025-06-18 RX ORDER — ACETAMINOPHEN 500 MG
1000 TABLET ORAL 3 TIMES DAILY
Qty: 90 TABLET | Refills: 0 | Status: SHIPPED | OUTPATIENT
Start: 2025-06-18

## 2025-06-18 RX ORDER — DIPHENHYDRAMINE HYDROCHLORIDE 50 MG/ML
25 INJECTION, SOLUTION INTRAMUSCULAR; INTRAVENOUS ONCE AS NEEDED
Status: ACTIVE | OUTPATIENT
Start: 2025-06-18 | End: 2025-06-18

## 2025-06-18 RX ORDER — TRAMADOL HYDROCHLORIDE 50 MG/1
50 TABLET ORAL EVERY 6 HOURS SCHEDULED
Status: DISCONTINUED | OUTPATIENT
Start: 2025-06-18 | End: 2025-06-19

## 2025-06-18 RX ORDER — DIPHENHYDRAMINE HYDROCHLORIDE 50 MG/ML
12.5 INJECTION, SOLUTION INTRAMUSCULAR; INTRAVENOUS EVERY 4 HOURS PRN
Status: DISCONTINUED | OUTPATIENT
Start: 2025-06-18 | End: 2025-06-19

## 2025-06-18 RX ORDER — NICOTINE POLACRILEX 4 MG
15 LOZENGE BUCCAL
Status: DISCONTINUED | OUTPATIENT
Start: 2025-06-18 | End: 2025-06-19

## 2025-06-18 RX ORDER — HYDROMORPHONE HYDROCHLORIDE 1 MG/ML
0.8 INJECTION, SOLUTION INTRAMUSCULAR; INTRAVENOUS; SUBCUTANEOUS EVERY 2 HOUR PRN
Status: DISCONTINUED | OUTPATIENT
Start: 2025-06-18 | End: 2025-06-19

## 2025-06-18 RX ORDER — ONDANSETRON 2 MG/ML
4 INJECTION INTRAMUSCULAR; INTRAVENOUS EVERY 6 HOURS PRN
Status: DISCONTINUED | OUTPATIENT
Start: 2025-06-18 | End: 2025-06-18 | Stop reason: HOSPADM

## 2025-06-18 RX ORDER — BUPROPION HYDROCHLORIDE 300 MG/1
300 TABLET ORAL DAILY
Status: DISCONTINUED | OUTPATIENT
Start: 2025-06-19 | End: 2025-06-19

## 2025-06-18 RX ORDER — FERROUS SULFATE 325(65) MG
325 TABLET, DELAYED RELEASE (ENTERIC COATED) ORAL
Status: DISCONTINUED | OUTPATIENT
Start: 2025-06-19 | End: 2025-06-19

## 2025-06-18 RX ORDER — BISACODYL 10 MG
10 SUPPOSITORY, RECTAL RECTAL
Status: DISCONTINUED | OUTPATIENT
Start: 2025-06-18 | End: 2025-06-19

## 2025-06-18 RX ORDER — OXYCODONE HYDROCHLORIDE 5 MG/1
5 TABLET ORAL EVERY 4 HOURS PRN
Status: DISCONTINUED | OUTPATIENT
Start: 2025-06-18 | End: 2025-06-19

## 2025-06-18 RX ORDER — ACETAMINOPHEN 500 MG
1000 TABLET ORAL ONCE AS NEEDED
Status: DISCONTINUED | OUTPATIENT
Start: 2025-06-18 | End: 2025-06-18 | Stop reason: HOSPADM

## 2025-06-18 RX ORDER — PROCHLORPERAZINE EDISYLATE 5 MG/ML
5 INJECTION INTRAMUSCULAR; INTRAVENOUS EVERY 8 HOURS PRN
Status: DISCONTINUED | OUTPATIENT
Start: 2025-06-18 | End: 2025-06-19

## 2025-06-18 RX ORDER — CYCLOBENZAPRINE HCL 10 MG
10 TABLET ORAL EVERY 8 HOURS PRN
Status: DISCONTINUED | OUTPATIENT
Start: 2025-06-18 | End: 2025-06-19

## 2025-06-18 RX ORDER — KETOROLAC TROMETHAMINE 30 MG/ML
30 INJECTION, SOLUTION INTRAMUSCULAR; INTRAVENOUS EVERY 6 HOURS
Status: DISCONTINUED | OUTPATIENT
Start: 2025-06-18 | End: 2025-06-19

## 2025-06-18 RX ORDER — HYDROCODONE BITARTRATE AND ACETAMINOPHEN 5; 325 MG/1; MG/1
2 TABLET ORAL ONCE AS NEEDED
Status: DISCONTINUED | OUTPATIENT
Start: 2025-06-18 | End: 2025-06-18 | Stop reason: HOSPADM

## 2025-06-18 RX ORDER — HYDROCODONE BITARTRATE AND ACETAMINOPHEN 5; 325 MG/1; MG/1
1 TABLET ORAL ONCE AS NEEDED
Status: DISCONTINUED | OUTPATIENT
Start: 2025-06-18 | End: 2025-06-18 | Stop reason: HOSPADM

## 2025-06-18 RX ORDER — HYDROMORPHONE HYDROCHLORIDE 1 MG/ML
0.4 INJECTION, SOLUTION INTRAMUSCULAR; INTRAVENOUS; SUBCUTANEOUS EVERY 2 HOUR PRN
Status: DISCONTINUED | OUTPATIENT
Start: 2025-06-18 | End: 2025-06-19

## 2025-06-18 RX ORDER — BUPROPION HYDROCHLORIDE 300 MG/1
300 TABLET ORAL DAILY
COMMUNITY

## 2025-06-18 RX ORDER — HYDROMORPHONE HYDROCHLORIDE 1 MG/ML
0.2 INJECTION, SOLUTION INTRAMUSCULAR; INTRAVENOUS; SUBCUTANEOUS EVERY 5 MIN PRN
Status: DISCONTINUED | OUTPATIENT
Start: 2025-06-18 | End: 2025-06-18 | Stop reason: HOSPADM

## 2025-06-18 RX ORDER — NICOTINE POLACRILEX 4 MG
30 LOZENGE BUCCAL
Status: DISCONTINUED | OUTPATIENT
Start: 2025-06-18 | End: 2025-06-18 | Stop reason: HOSPADM

## 2025-06-18 RX ORDER — FLUTICASONE PROPIONATE AND SALMETEROL 250; 50 UG/1; UG/1
1 POWDER RESPIRATORY (INHALATION) 2 TIMES DAILY
Status: DISCONTINUED | OUTPATIENT
Start: 2025-06-18 | End: 2025-06-19

## 2025-06-18 RX ORDER — SENNOSIDES 8.6 MG
17.2 TABLET ORAL NIGHTLY
Status: DISCONTINUED | OUTPATIENT
Start: 2025-06-18 | End: 2025-06-19

## 2025-06-18 RX ORDER — TRAMADOL HYDROCHLORIDE 50 MG/1
50 TABLET ORAL EVERY 6 HOURS PRN
Qty: 50 TABLET | Refills: 0 | Status: SHIPPED | OUTPATIENT
Start: 2025-06-18

## 2025-06-18 RX ORDER — TIZANIDINE 2 MG/1
2 TABLET ORAL EVERY 6 HOURS PRN
Status: DISCONTINUED | OUTPATIENT
Start: 2025-06-18 | End: 2025-06-19

## 2025-06-18 RX ADMIN — MIDAZOLAM HYDROCHLORIDE 2 MG: 1 INJECTION INTRAMUSCULAR; INTRAVENOUS at 10:41:00

## 2025-06-18 RX ADMIN — DEXAMETHASONE SODIUM PHOSPHATE 4 MG: 4 MG/ML VIAL (ML) INJECTION at 10:52:00

## 2025-06-18 RX ADMIN — KETOROLAC TROMETHAMINE 30 MG: 30 INJECTION, SOLUTION INTRAMUSCULAR; INTRAVENOUS at 11:52:00

## 2025-06-18 RX ADMIN — BUPIVACAINE HYDROCHLORIDE 20 ML: 2.5 INJECTION, SOLUTION EPIDURAL; INFILTRATION; INTRACAUDAL; PERINEURAL at 10:48:00

## 2025-06-18 RX ADMIN — SODIUM CHLORIDE, SODIUM LACTATE, POTASSIUM CHLORIDE, CALCIUM CHLORIDE: 600; 310; 30; 20 INJECTION, SOLUTION INTRAVENOUS at 10:54:00

## 2025-06-18 NOTE — ANESTHESIA POSTPROCEDURE EVALUATION
[facility]    Kaylee Shaikh Patient Status:  Outpatient in a Bed   Age/Gender 59 year old female MRN [id number]   Location [facility] SURGERY Attending Carlo Allen MD   Hosp Day # 0 PCP Swati Gallego MD       Anesthesia Post-op Note    RIGHT TOTAL HIP ARTHROPLASTY    Procedure Summary       Date: 06/18/25 Room / Location:  MAIN OR 12 / [facility] MAIN OR    Anesthesia Start: 1032 Anesthesia Stop: 1202    Procedure: RIGHT TOTAL HIP ARTHROPLASTY (Right: Hip) Diagnosis: (PRIMARY OSTEOARTHRITIS OF RIGHT HIP)    Surgeons: Carlo Allen MD Anesthesiologist: Josiah Angelo MD    Anesthesia Type: spinal ASA Status: 3            Anesthesia Type: spinal    Vitals Value Taken Time   /74 06/18/25 12:03   Temp 98 06/18/25 12:03   Pulse 74 06/18/25 12:03   Resp 12 06/18/25 12:03   SpO2 99 06/18/25 12:03           Patient Location: PACU    Anesthesia Type: spinal    Airway Patency: patent    Postop Pain Control: adequate    Mental Status: preanesthetic baseline    Nausea/Vomiting: none    Cardiopulmonary/Hydration status: stable euvolemic    Complications: no apparent anesthesia related complications    Postop vital signs: stable    Dental Exam: Unchanged from Preop    Patient to be discharged from PACU when criteria met.

## 2025-06-18 NOTE — INTERVAL H&P NOTE
Pre-op Diagnosis: PRIMARY OSTEOARTHRITIS OF RIGHT HIP    The above referenced H&P was reviewed by Carlo Allen MD on 6/18/2025, the patient was examined and no significant changes have occurred in the patient's condition since the H&P was performed.  I discussed with the patient and/or legal representative the potential benefits, risks and side effects of this procedure; the likelihood of the patient achieving goals; and potential problems that might occur during recuperation.  I discussed reasonable alternatives to the procedure, including risks, benefits and side effects related to the alternatives and risks related to not receiving this procedure.  We will proceed with procedure as planned.

## 2025-06-18 NOTE — ANESTHESIA PROCEDURE NOTES
[facility] Block    Performed by: Josiah Angelo MD  Authorized by: Josiah Angelo MD      General Information and Staff    Start Time:   Anesthesiologist:  Josiah Angelo MD  Patient Location:  OR      Site Identification: real time ultrasound guided and image stored and retrievable    Block site/laterality marked before start: site marked  Reason for Block: at surgeon's request and post-op pain management    Preanesthetic Checklist: 2 patient identifers, IV checked, risks and benefits discussed, monitors and equipment checked, pre-op evaluation, timeout performed, anesthesia consent, sterile technique used, no prohibitive neurological deficits and no local skin infection at insertion site      Procedure Details    Patient Position:   Prep: ChloraPrep    Monitoring:  Cardiac monitor, continuous pulse ox, blood pressure cuff and heart rate  Block Type:  Fascia iliaca  Laterality:  Right  Injection Technique:  Single-shot    Needle    Needle Type:  Short-bevel and echogenic  Needle Gauge:  21 G  Needle Length:  100 mm  Needle Localization:  Ultrasound guidance  Reason for Ultrasound Use: appropriate spread of the medication was noted in real time and no ultrasound evidence of intravascular and/or intraneural injection            Assessment    Injection Assessment:  Good spread noted, negative resistance, negative aspiration for heme, incremental injection and low pressure  Heart Rate Change: No    - Patient tolerated block procedure well without evidence of immediate block related complications.     Medications      Additional Comments

## 2025-06-18 NOTE — PLAN OF CARE
A&Ox4, VSS on room air. POD#0 dressing C/D/I. Pain well controlled. Patient up in chair, ambulated with therapy, tolerating well. Voiding. Plan to DC with [place] when cleared

## 2025-06-18 NOTE — CM/SW NOTE
Sw met with pt to discuss pre-op plan for [facility]  She is in agreement and has already talked to HHC.  Pt lives with spouse. She is concerned about the 14 stairs to her bedroom level.    PT/OT pending.    Pt given aidin quality data for [facility].     ALANA to send AVS to [facility] .    Toyin Chun LCSW  /Discharge Planner

## 2025-06-18 NOTE — DISCHARGE INSTRUCTIONS
Sometimes managing your health at home requires assistance.  The [facility] team has recognized your preference to use [facility], [facility].  They can be reached by phone at [phone].  The fax number for your reference is [phone]  A representative from the home health agency will contact you or your family to schedule your first visit.      Hip Replacement Discharge Instructions    Dear Patient,     [facility] cares about your progress with recovery following your joint replacement surgery.     300 days from your scheduled surgery, [facility] will send you a follow-up survey to help us understand how your surgery impacted your mobility, pain, and overall quality of life. Please make every effort to complete this survey. The information collected from this survey will be used by your physician to track your recovery.     Sincerely,     [facility] [place]    Activity    Bathing  No tub baths, pools, or saunas until cleared by surgeon (about 4-6 weeks because it takes that long for the incision on the skin to heal and be a barrier to prevent infection.)  When allowed to shower:      AQUACEL dressing is waterproof and does not require being covered before showering.  Pat dressing and surrounding skin dry after shower                      AQUACEL   REMOVE DRESSING ON 6/25/25. THEN PERFORM DAILY GAUZE DRESSING CHANGES UNTIL YOUR FOLLOW UP APPOINTMENT WITH THE SURGEON.       MEDIPORE/COVERLET dressing is NOT waterproof and REQUIRES being covered with a waterproof barrier to keep the dressing and incision dry.  SARAN WRAP, GLAD WRAP, PRESS N SEAL WORK REALLY WELL BUT ANY PLASTIC WRAP WILL DO.  Do not wash incision.   Remove entire wrapping and old dressing (if Medipore/coverlet) after showering. Pat dry with a CLEAN TOWEL if necessary and cover incision with new Medipore/coverlet. For other types of dressings, follow surgeon’s  orders.                  MEDIPORE/COVERLET            Driving  Do not drive until cleared by surgeon. This is usually four to six weeks after surgery. Discuss this at follow-up office visit.   Not allowed while taking narcotic pain medication or muscle relaxants.    Sex  Usually allowed after four to six weeks - check with surgeon at your office visit.  Return to work  Usually allowed after four to six weeks. Discuss specific work activities with your surgeon.    Restrictions  For hip replacement surgery, follow instructions provided by physical therapy    No smoking  Avoid smoking. It is known to cause breathing problems and can decrease the rate of healing.    Incision care/Dressing changes  Wash hands before and after dressing changes.    FOR MEDIPORE/COVERLET DRESSINGS:  Change dressing daily using Medipore/coverlet once Aquacel (waterproof) dressing is removed (which is about 7 days after surgery). Patient should be standing or lying flat so dressing goes on smoothly.  (This dressing needed for hip patients because of location of incision-don’t want contamination from bathroom use)   Continue this until your first visit with your surgeon’s office.  There could be a small amount of redness around the staples or incision; this is normal.  Watch for increased redness, warmth, any odor, increased drainage or opening of the incision. A little clear yellow or blood tinged drainage is normal up to 2 weeks after surgery but it should be less every day until it stops.  Call physician if you notice any concerning changes.  Sutures/staples will be removed at first office visit (10 days- 3 weeks).                         MEDIPORE /COVERLET          Medication  Anticoagulants = blood thinners (Xarelto, Eliquis, Lovenox, Coumadin or Aspirin)  Pill or shot form depending on what your physician orders.   IF placed on Coumadin, you may also need lab work done for monitoring.  You will bleed easier and bruise easier while on  these medications.     Usually you will be on a blood thinner for about 4-5 weeks.  Contact your physician if you have signs of bruising, nose bleeds or blood in your urine. Use electric razors and soft toothbrushes only.  Do not take aspirin while taking blood thinners unless ordered by your physician.  Review anticoagulant education information sheet provided.    Discomfort  Surgical discomfort is normal for one to two months.  Have realistic goals and keep a positive outlook.  Keep pain manageable; pain should not disrupt your sleep or activities like getting out of bed or walking.  You may need pain medication regularly (every 4-6 hours) the first 2 weeks and then begin to decrease how often you are taking it.  Take pain medication as prescribed with food, especially before therapy, allowing 30-60 minutes to take effect.  Do not drink alcohol while on pain medication.  As you have less discomfort, decrease the amount of pain medication you take. Use plain Tylenol (acetaminophen) for less severe pain.  Some pain medications have Tylenol (acetaminophen) in them such as Norco and Percocet. Do NOT take Tylenol (acetaminophen) within 4 hours of a dose of these medications.  Apply ice  or cold therapy to surgical site for 20 minutes at least four times a day, especially after therapy. Be sure there is a thin cloth barrier between skin and ice or cold therapy.  Change position at least every 45 minutes while awake to avoid stiffness or increased discomfort.  Deep breathing and relaxation techniques and distractions can help!  If you focus on something else, you do not experience the pain the same. Take advantage of everything available to your to help control you discomfort.  Contact physician if discomfort does not respond to pain medication.    Body changes  Constipation is common with the use of narcotics.  Eat fiber rich foods and drink plenty of fluids.  Use stool softeners such as Colace or Senakot while on  narcotics, and laxatives such as Miralax or Milk of Magnesia if needed.   An enema or suppository may be needed if above measures do not work.    Prevention of infection and promotion of healing  Good hand washing is important. Everyone should wash their hands or use hand  as soon as they walk in your house-whether they live there or are visiting.  Keep bed linen/clothing freshly laundered.  Do not allow others or pets to touch your incision.  Avoid people that have colds or the flu.  Your surgeon may recommend that you take antibiotics before you undergo any dental or other invasive surgical procedures after your joint replacement. Speak with your physician about this at your post-op office visit.  Eat a balanced diet high in fiber and drink plenty of fluids.   Continue using incentive spirometry because narcotics make you sleepy so you may not take good deep breaths. We do not want you to get pneumonia.     Post op Office visits  Schedule 10 days to 3 weeks after surgery WITH SURGEON’s office.  Additional visits may need to be scheduled. Your physician will discuss this at first post-op office visit.  Schedule outpatient physical therapy per your surgeon’s orders.  Schedule one week follow up after surgery WITH PRIMARY CARE PHYSICIAN; review your medications over last 6 months.  Your body gets stressed by surgery and that stress can affect all your other health issues (such as high blood pressure, diabetes, CHF, afib, and asthma just to name a few).  We don’t want those other health issues to cause you to get readmitted to the hospital; much better for you to catch developing problems and prevent them from becoming larger ones.  TOI HOSE - IF ordered by your surgeon, wear these during the day and off at night.  Surgeon will tell you when you don’t need them anymore.    Notify your surgeon if you notice any of the following signs  Separation of incision line.  Increased redness, swelling, or warmth of skin  around incision.  Increased or foul smelling drainage from incision  Red streaks on skin near incision.  Temperature >100.4F.  Increased pain at incision not relieved by pain medication.    Signs of Possible Dislocation  Increased severe leg or groin pain  Turning in or out of surgical leg that is new  Increased numbness, tingling to leg  Inability to walk or put weight on your surgical leg        Signs of blood clot  Pain, excessive tenderness, redness, or swelling in leg or calf (other than incision site).    Go directly to the ER or CALL 911 if  you:  become short of breath  have chest pain  cough up blood  have unexplained anxiety with breathing   Traveling and Handicapped parking  Check with you surgeon when you are allowed to travel so you don’t set yourself up for greater chance of complications.  If traveling by car, get out to stretch every 2 hours.  This helps prevent stiffness.  You may need to do this any time you travel for the first year after surgery.  If traveling by plane, BEFORE you get into a security line, let them know that you had your hip replaced, as you will most likely set off the metal detector. The doctors no longer provide an identification card for this as they are easily copied. ALSO request a wheelchair the first year to board and get off a plane…this aids in priority seating and you should sit on the aisle or at the bulkhead where you can easily stretch your legs and get up to walk up and down the aisles…this helps prevent blood clots and stiffness.  TEMPORARY HANDICAP PARKING APPLICATION  (good for 3-6 months)  - At Surgeon or PCP visit, request they fill out the form, then go to Counts include 234 beds at the [facility] (only time you do not wait in a long line there). Some [facility] offices provide the same service. ([place] and [place] have this service; if you live in another [facility], you may check with them as well). You need space to open car doors to position yourself properly with walker to get in  and out of your car safely; some parking spaces are  practically on top of each other and do not give you enough room.     SPECIAL  INSTRUCTIONS:  Spanda- hip replacement(single layer compression sleeve):     All patients should wear the compression sleeve until first follow up visit to surgeon’s office (about 2-3 weeks) and then check with surgeon if need to continue use.  Take off to shower. Best to keep on as much as possible, even at night, except when washing out.  Wash with mild soap and water; DRIP dry overnight- put back on before getting up for the day.  Use one long tube on the calf.   It should end up just below the back of the knee and the other end on the ball of the foot to expose the toes.   Makes sure it is NOT bunched up anywhere.  IF the Spanda- feels TOO tight, then REMOVE it and call your surgeon to let them know.

## 2025-06-18 NOTE — PHYSICAL THERAPY NOTE
PHYSICAL THERAPY JOINT EVALUATION - INPATIENT     Room Number: 357/357-A  Evaluation Date: 6/18/2025  Type of Evaluation: Initial  Physician Order: PT Eval and Treat    Presenting Problem: s/p R MORIS on 6/18/25  Co-Morbidities : DM, HTN, asthma, anxiety  Reason for Therapy: Mobility Dysfunction and Discharge Planning    PHYSICAL THERAPY ASSESSMENT   Patient is a 59 year old female admitted 6/18/2025 for R MORIS.  Prior to admission, patient's baseline is independent with no AD.  Patient is currently functioning below baseline with bed mobility, transfers, gait, and stair negotiation.  Patient is requiring contact guard assist as a result of the following impairments: decreased functional strength, pain, impaired   balance, and limited   ROM.  Physical Therapy will continue to follow for duration of hospitalization.    Patient will benefit from continued skilled PT Services at discharge to promote prior level of function.  Anticipate patient will return home with home health PT.    PLAN DURING HOSPITALIZATION  Nursing Mobility Recommendation : 1 Assist  PT Device Recommendation: Rolling walker  PT Treatment Plan: Body mechanics, Bed mobility, Endurance, Energy conservation, Patient education, Gait training, Family education, Range of motion, Strengthening, Stoop training, Stair training, Transfer training, Balance training  Rehab Potential : Good  Frequency (Obs): Daily     CURRENT GOALS  Goal #1  Patient is able to demonstrate supine - sit EOB @ level: supervision     Goal #2  Patient is able to demonstrate transfers Sit to/from Stand at assistance level: supervision   Goal #3    Patient is able to ambulate 150 feet with assistive device at assistance level: supervision   Goal #4    Patient will negotiate 4 stairs/one curb w/ assistive device and supervision   Goal #5    Patient verbalizes and/or demonstrates all precautions and safety concerns independently    Goal #6      Goal Comments: Goals established on  6/18/2025    PHYSICAL THERAPY MEDICAL/SOCIAL HISTORY  History related to current admission: Patient is a 59 year old female admitted on 6/18/2025 from home for R MORIS.    HOME SITUATION  Type of Home: House   Home Layout: Two level              Lives With: Spouse  Drives: Yes  Patient Regularly Uses: None (has RW)    Prior Level of [place]: Pt reports she is typically independent with ADLs and ambulates with no AD. Pt has comfort height toilets.     SUBJECTIVE  Pt pleasant and cooperative    OBJECTIVE  Precautions: Bed/chair alarm, MORIS - anterior, Hip abduction pillow (plus no hip flexion past 90 degrees)  Fall Risk: High fall risk    WEIGHT BEARING RESTRICTION  R Lower Extremity: Weight Bearing as Tolerated    PAIN ASSESSMENT  Rating: Unable to rate  Location: R hip  Management Techniques: Activity promotion, Relaxation, Repositioning    COGNITION  Overall Cognitive Status:  WFL - within functional limits    RANGE OF MOTION AND STRENGTH ASSESSMENT  Upper extremity ROM and strength are within functional limits     Lower extremity ROM is within functional limits, except RLE s/p MORIS    Lower extremity strength is within functional limits, except RLE s/p MORIS      BALANCE  Static Sitting: Good  Dynamic Sitting: Fair +  Static Standing: Poor +  Dynamic Standing: Poor +    ADDITIONAL TESTS                                    ACTIVITY TOLERANCE   Denies dizziness                      O2 WALK       NEUROLOGICAL FINDINGS                        AM-PAC '6-Clicks' INPATIENT SHORT FORM - BASIC MOBILITY  How much difficulty does the patient currently have...  Patient Difficulty: Turning over in bed (including adjusting bedclothes, sheets and blankets)?: A Little   Patient Difficulty: Sitting down on and standing up from a chair with arms (e.g., wheelchair, bedside commode, etc.): A Little   Patient Difficulty: Moving from lying on back to sitting on the side of the bed?: A Little   How much help from another person does the  patient currently need...   Help from Another: Moving to and from a bed to a chair (including a wheelchair)?: A Little   Help from Another: Need to walk in hospital room?: A Little   Help from Another: Climbing 3-5 steps with a railing?: A Little       AM-PAC Score:  Raw Score: 18   Approx Degree of Impairment: 46.58%   Standardized Score (AM-PAC Scale): 43.63   CMS Modifier (G-Code): CK    FUNCTIONAL ABILITY STATUS  Gait Assessment   Functional Mobility/Gait Assessment  Gait Assistance: Minimum assistance, Contact guard assist  Distance (ft): 45  Assistive Device: Rolling walker  Pattern: R Decreased stance time    Skilled Therapy Provided: Per RN okay to work with pt. Pt received in supine and was agreeable to PT session.     Bed Mobility:  Rolling: NT  Supine to sit: min A   Sit to supine: NT     Transfer Mobility:  Sit to stand: CGA   Stand to sit: CGA  Gait = pt ambulated with RW and min A that progressed to CGA    Therapist's Comments: Pt educated on role of therapy, goals for session, safety, fall prevention, hip precautions (handout provided), WBAT, and activity recommendations.     Exercise/Education Provided:  Bed mobility  Body mechanics  Energy conservation  Functional activity tolerated  Gait training  Posture  Transfer training    Patient End of Session: Up in chair, Needs met, Call light within reach, RN aware of session/findings, All patient questions and concerns addressed, Hospital anti-slip socks, Alarm set    Patient Evaluation Complexity Level:  History Moderate - 1 or 2 personal factors and/or co-morbidities   Examination of body systems Low -  addressing 1-2 elements   Clinical Presentation Low- Stable   Clinical Decision Making Low Complexity       PT Session Time: 30 minutes  Therapeutic Activity: 10 minutes

## 2025-06-18 NOTE — CM/SW NOTE
06/18/25 1000   CM/SW Referral Data   Referral Source Social Work (self-referral)   Reason for Referral Discharge planning   Informant EMR;Clinical Staff Member     Pt here for MORIS today.  Pre-op plan was [facility].    Await therapy input after surgery.    Toyin Chun LCSW  /Discharge Planner

## 2025-06-18 NOTE — OPERATIVE REPORT
DATE OF PROCEDURE:  6/18/2025  PREOPERATIVE DIAGNOSIS: Severe osteoarthritis right hip.   POSTOPERATIVE DIAGNOSIS: Severe osteoarthritis right hip.   PROCEDURE PERFORMED: Non-cemented right total hip arthroplasty.   SURGEON:  Carlo Allen M.D.  FIRST ASSISTANT: Elias Ram PA-C    SECOND ASSISTANT:  Donal Lebron  ANESTHESIA: Spinal   INDICATIONS: The patient has severe right hip osteoarthritis not responding to conservative treatment, including antiinflammatories, exercise, and activity modification. There are severe limitations in activities of daily living including ambulation and exercise. The patient is admitted for total hip arthroplasty at this time.   DESCRIPTION OF PROCEDURE: Patient was brought to the operating room and after appropriate anesthesia, was placed in the left lateral decubitus position with an axillary roll in place, padding under all bony prominences, and a peg board used for positioning. Sterile prep and drape was performed. Preoperative antibiotics had been given.  It was medically necessary for the presence of the assistants listed for safe patient care.  This included positioning of the leg, holding of retractors to protect the underlying neurovascular structures and soft tissues.  It was required for the assistants to hold retractors to protect soft tissues while the primary surgeon made the bone cuts on the femur, as well as acetabular retractors to protect soft tissues and neurovascular structures while the primary surgeon performed the reaming on the acetabulum.  The assistants were also necessary for traction and dislocation of the hip as well as relocation of the hip as the primary surgeon positioned the femoral head during these procedures.  An anterolateral approach to the right hip was performed. An incision was made over the greater trochanter, and dissection was taken down to the fascia, which was split in line with the skin incision. The anterior half of the abductor  mechanism and the anterior fifth of the vastus lateralis were reflected anteriorly for later repair. An anterior capsulotomy was performed, and the hip was dislocated. There were severe arthritic changes on both the femoral and acetabular sides. A femoral cut was made above the lesser trochanter and the femoral head was sent to pathology.  There were no obvious lesions.  Attention was turned to the acetabulum. The labrum was excised. The acetabulum was exposed and anterior and posterior retractors placed. The reaming was started in the mid 40s and taken up to size 50.  A 50 cup was impacted into place and this gave excellent press-fit fixation.  A 25 mm screw was utilized for supplemental fixation. An apical hole eliminator was used.. A neutral liner to fit the acetabulum and to accept a 36 mm femoral head was impacted into place.  Attention was turned to the femur. A cylinder osteotome was used to clean out the greater trochanter. A starting awl was used. Broaches for the [place] system were utilized starting at size 8 and going up to size 9 which gave excellent fit and fill. Trial was performed with a standard offset neck and using various neck lengths. A +1.5mm neck length gave approximately equal leg lengths and excellent stability.  Stability was checked with internal and external rotation applied with the hip in extension, flexion of 45 degrees, and flexion of 90 degrees.  All of these were in neutral abduction.  The sleep position of partial flexion and adduction was checked as well.  Trial components were removed. Final implants the same size as the final trials were placed. Thorough irrigation was performed. Reduction was performed. Repeat stability checks were performed in extension, 45 degrees of flexion, and 90 degrees of flexion in both internal and external rotation. The sleep position was checked. Leg lengths were again checked and seemed to be equal. The abductor mechanism was repaired using #1  Vicryl. The vastus lateralis was similarly repaired.  The fascia was closed with a running barbed suture. Subcutaneous tissue was closed with inverted 2-0 Vicryl. Skin was closed with staples. An aquacell dressing plus gauze covering was applied. The patient was placed into an abduction pillow, rolled supine, and taken to the post anesthesia recovery room in stable condition.   Estimated blood loss was 150 mL. There were no complications. The femoral head was sent as specimen.

## 2025-06-18 NOTE — ANESTHESIA PREPROCEDURE EVALUATION
PRE-OP EVALUATION    Patient Name: Kaylee Shakih    Admit Diagnosis: PRIMARY OSTEOARTHRITIS OF RIGHT HIP    Pre-op Diagnosis: PRIMARY OSTEOARTHRITIS OF RIGHT HIP    RIGHT TOTAL HIP ARTHROPLASTY    Anesthesia Procedure: RIGHT TOTAL HIP ARTHROPLASTY (Right)    Surgeons and Role:     * Carlo Allen MD - Primary    Pre-op vitals reviewed.  Temp: 97 °F (36.1 °C)  Pulse: 75  Resp: 18  BP: 127/72  SpO2: 95 %  Body mass index is 44.46 kg/m².    Current medications reviewed.  Hospital Medications:  Current Medications[1]    Outpatient Medications:   Prescriptions Prior to Admission[2]    Allergies: Patient has no known allergies.      Anesthesia Evaluation        Anesthetic Complications           GI/Hepatic/Renal      (-) GERD       (-) chronic renal disease   (-) liver disease                 Cardiovascular                (+) obesity  (+) hypertension and well controlled    (-) CAD  (-) past MI  (-) CABG/stent  (-) pacemaker/AICD  (-) valvular problems/murmurs     (-) dysrhythmias   (-) CHF  (-) angina     (-) CHAN         Endo/Other      (+) diabetes and well controlled, type 2,                          Pulmonary      (+) asthma  (-) COPD                   Neuro/Psych          (-) CVA     (-) seizures                       Past Surgical History[3]  Social Hx on file[4]  History   Drug Use    Types: Cannabis     Comment: EDIBLES     Available pre-op labs reviewed.               Airway      Mallampati: II  Mouth opening: 3 FB  TM distance: 4 - 6 cm  Neck ROM: full Cardiovascular    Cardiovascular exam normal.         Dental             Pulmonary    Pulmonary exam normal.                 Other findings              ASA: 3   Plan: spinal  NPO status verified and           Plan/risks discussed with: patient                Present on Admission:  **None**             [1]    acetaminophen (Tylenol Extra Strength) tab 1,000 mg  1,000 mg Oral Once    scopolamine (Transderm-Scop) 1 MG/3DAYS patch 1 patch  1 patch Transdermal  Once    lactated ringers infusion   Intravenous Continuous    tranexamic acid in sodium chloride 0.7% (Cyklokapron) 1000 mg/100mL infusion premix 1,000 mg  1,000 mg Intravenous Once    ceFAZolin (Ancef) 2g in 10mL IV syringe premix  2 g Intravenous Once    clonidine/epinephrine/ropivacaine/ketorolac in 0.9% NaCl 60 mL pain cocktail syringe for hip arthroplasty   Infiltration Once (Intra-Op)   [2]   Medications Prior to Admission   Medication Sig Dispense Refill Last Dose/Taking    metFORMIN  MG Oral Tablet 24 Hr Take 1 tablet (500 mg total) by mouth daily with breakfast.   6/18/2025 at  8:00 AM    buPROPion  MG Oral Tablet 24 Hr Take 1 tablet (300 mg total) by mouth daily.   6/18/2025 at  6:00 AM    montelukast 10 MG Oral Tab Take 1 tablet (10 mg total) by mouth nightly.   6/18/2025 at  6:00 AM    albuterol 108 (90 Base) MCG/ACT Inhalation Aero Soln Inhale 2 puffs into the lungs every 4 (four) hours as needed for Wheezing.   Past Week    celecoxib 200 MG Oral Cap Take 1 capsule (200 mg total) by mouth daily.   6/18/2025 at  6:00 AM    fluticasone-umeclidin-vilant 100-62.5-25 MCG/ACT Inhalation Aerosol Powder, Breath Activated Inhale 1 puff into the lungs daily.   6/18/2025 at  6:00 AM    HYDROcodone-acetaminophen  MG Oral Tab Take 1 tablet by mouth every 6 (six) hours as needed for Pain.   6/17/2025 at  8:00 AM    rosuvastatin 5 MG Oral Tab Take 1 tablet (5 mg total) by mouth daily.   6/18/2025 at  6:00 AM    fluticasone furoate-vilanterol 100-25 MCG/ACT Inhalation Aerosol Powder, Breath Activated Inhale 1 puff into the lungs daily. (Patient taking differently: Inhale 1 puff into the lungs daily as needed.) 1 each 0 Past Month    losartan-hydroCHLOROthiazide 50-12.5 MG Oral Tab Take 1 tablet by mouth in the morning.   6/17/2025 at  8:00 AM    Tirzepatide-Weight Management 2.5 MG/0.5ML Subcutaneous Solution Auto-injector Inject 2.5 mg into the skin every 7 days. (Patient taking differently: Inject  2.5 mg into the skin every 7 days. Not started yet.)       EPINEPHrine 0.3 MG/0.3ML Injection Solution Auto-injector Apply 1 Application topically as needed.       predniSONE 20 MG Oral Tab Take 2 tabs daily x5 days, then one tab daily x5 days (Patient not taking: Reported on 6/18/2025) 15 tablet 0 Not Taking   [3]   Past Surgical History:  Procedure Laterality Date    Needle biopsy left      early 20's    Removal salivary stone,uncomplic     [4]   Social History  Socioeconomic History    Marital status:    Tobacco Use    Smoking status: Some Days     Types: Cigarettes    Smokeless tobacco: Never   Vaping Use    Vaping status: Never Used   Substance and Sexual Activity    Alcohol use: Yes    Drug use: Yes     Types: Cannabis     Comment: EDIBLES

## 2025-06-18 NOTE — ANESTHESIA PROCEDURE NOTES
Spinal Block    Performed by: Josiah Angelo MD  Authorized by: Josiah Angelo MD      General Information and Staff    Start Time:   Anesthesiologist:  Josiah Angelo MD  Performed by:  Anesthesiologist  Patient Location:  OR  Site identification: surface landmarks    Preanesthetic Checklist: patient identified, IV checked, risks and benefits discussed, monitors and equipment checked, pre-op evaluation, timeout performed, anesthesia consent and sterile technique used      Procedure Details    Patient Position:  Sitting  Prep: ChloraPrep    Monitoring:  Cardiac monitor, heart rate and continuous pulse ox  Approach:  Midline  Location:  L3-4  Injection Technique:  Single-shot    Needle    Needle Type:  Sprotte  Needle Gauge:  24 G  Needle Length:  3.5 in    Assessment    Sensory Level:  T10  Events: clear CSF, CSF aspirated, well tolerated and blood negative      Additional Comments

## 2025-06-18 NOTE — CONSULTS
Rd Hospitalist H&P/Consult note       CC: asked to see for post op med management    PCP: Swati Gallego MD    History of Present Illness: Patient is a 59 year old female with PMH sig for  HTN, hLD, DM 2, asthma, here for scheduled R hip surgery  Post op pain is controlled  No emesis. Tolerating po   Denied any cardiac hx aside from htn  Asthma is well controlled    PMH  Past Medical History[1]     PSH  Past Surgical History[2]     ALL:  Allergies[3]     Home Medications:  Medications Taking[4]      Soc Hx  Social History     Tobacco Use    Smoking status: Some Days     Types: Cigarettes    Smokeless tobacco: Never   Substance Use Topics    Alcohol use: Yes        Fam Hx  Family History[5]    Review of Systems  Comprehensive ROS reviewed and negative except for what's stated above.         OBJECTIVE:  /82 (BP Location: Right arm)   Pulse 66   Temp 97.4 °F (36.3 °C) (Oral)   Resp 18   Ht 5' 4\" (1.626 m)   Wt 259 lb (117.5 kg)   LMP 05/22/2017 (Approximate)   SpO2 91%   BMI 44.46 kg/m²   General:  Alert, no distress, appears stated age.   Head:  Normocephalic    Eyes:  Sclera anicteric    Throat: MMM   Neck: Supple    Lungs:   Clear to auscultation bilaterally.     Chest wall:  No tenderness or deformity.   Heart:  Regular rate and rhythm    Abdomen:   Soft,     Extremities: Atraumatic,    Skin: No visible rashes or lesions.           Diagnostic Data:    CBC/Chem  No results for input(s): \"WBC\", \"HGB\", \"MCV\", \"PLT\", \"BAND\", \"INR\" in the last 168 hours.    Invalid input(s): \"LYM#\", \"MONO#\", \"BASOS#\", \"EOSIN#\"    No results for input(s): \"NA\", \"K\", \"CL\", \"CO2\", \"BUN\", \"CREATSERUM\", \"GLU\", \"CA\", \"CAION\", \"MG\", \"PHOS\" in the last 168 hours.    No results for input(s): \"ALT\", \"AST\", \"ALB\", \"AMYLASE\", \"LIPASE\", \"LDH\" in the last 168 hours.    Invalid input(s): \"ALPHOS\", \"TBIL\", \"DBIL\", \"TPROT\"    No results for input(s): \"TROP\" in the last 168 hours.         Radiology: XR HIP W OR WO PELVIS 1 VIEW, RIGHT  (CPT=73501)  Result Date: 6/18/2025  PROCEDURE:  XR HIP W OR WO PELVIS 1 VIEW, RIGHT (CPT=73501)  TECHNIQUE:  Unilateral view of the hip.  COMPARISON:  None.  INDICATIONS:  Postop right total hip  PATIENT STATED HISTORY: (As transcribed by Technologist)  Patient offered no additional history at this time.    FINDINGS:  Right total hip prosthesis in place.  No fracture or dislocation.  Lateral soft tissue gas.            CONCLUSION:  Right total hip prosthesis in place.   LOCATION:  [facility]   Dictated by (CST): Javid Perry MD on 6/18/2025 at 12:54 PM     Finalized by (CST): Javid Perry MD on 6/18/2025 at 12:54 PM          ASSESSMENT / PLAN:     Patient is a 59 year old female with PMH sig for  HTN, hLD, DM 2, asthma, here for scheduled R hip surgery    Impression    -OA sp Non-cemented right total hip arthroplasty 06/18/25  -post op pain    -HTN  -HLD  -Dm2  -morbid obesity bmi 44    Plan    -PT OT  -pain control    -resume cozaar. B p controlled  -resume statin  -ISS    Scds      Further recommendations pending patient's clinical course. Rd hospitalist to continue to follow patient while in house    Patient and/or patient's family given opportunity to ask questions and note understanding and agreeing with therapeutic plan as outlined    Juanito Sultana Hospitalist  [phone]  Answering Service: [phone]           [1]   Past Medical History:   Anxiety state    Asthma (HCC)    CPAP (continuous positive airway pressure) dependence    Depression    Diabetes (HCC)    High blood pressure    High cholesterol    Pneumonia    Visual impairment   [2]   Past Surgical History:  Procedure Laterality Date    Needle biopsy left      early 20's    Removal salivary stone,uncomplic     [3] No Known Allergies  [4]   Outpatient Medications Marked as Taking for the 6/18/25 encounter (Hospital Encounter)   Medication Sig Dispense Refill    metFORMIN  MG Oral Tablet 24 Hr Take 1 tablet (500 mg total) by mouth  daily with breakfast.      buPROPion  MG Oral Tablet 24 Hr Take 1 tablet (300 mg total) by mouth daily.      aspirin 81 MG Oral Tab EC Take 1 tablet (81 mg total) by mouth in the morning and 1 tablet (81 mg total) before bedtime. 56 tablet 0    acetaminophen 500 MG Oral Tab Take 2 tablets (1,000 mg total) by mouth 3 (three) times daily. 90 tablet 0    traMADol 50 MG Oral Tab Take 1 tablet (50 mg total) by mouth every 6 (six) hours as needed. 50 tablet 0    oxyCODONE 5 MG Oral Tab Take 1 tablet (5 mg total) by mouth every 4 (four) hours as needed. 60 tablet 0    CALCIUM OR Take 1 tablet by mouth daily.      multivitamin Oral Tab Take 1 tablet by mouth daily.      BIOTIN OR Take 1 tablet by mouth daily.      montelukast 10 MG Oral Tab Take 1 tablet (10 mg total) by mouth nightly.      albuterol 108 (90 Base) MCG/ACT Inhalation Aero Soln Inhale 2 puffs into the lungs every 4 (four) hours as needed for Wheezing.      celecoxib 200 MG Oral Cap Take 1 capsule (200 mg total) by mouth daily.      fluticasone-umeclidin-vilant 100-62.5-25 MCG/ACT Inhalation Aerosol Powder, Breath Activated Inhale 1 puff into the lungs daily.      HYDROcodone-acetaminophen  MG Oral Tab Take 1 tablet by mouth every 6 (six) hours as needed for Pain.      rosuvastatin 5 MG Oral Tab Take 1 tablet (5 mg total) by mouth daily.      fluticasone furoate-vilanterol 100-25 MCG/ACT Inhalation Aerosol Powder, Breath Activated Inhale 1 puff into the lungs daily. (Patient taking differently: Inhale 1 puff into the lungs daily as needed.) 1 each 0    losartan-hydroCHLOROthiazide 50-12.5 MG Oral Tab Take 1 tablet by mouth in the morning.     [5] History reviewed. No pertinent family history.

## 2025-06-19 ENCOUNTER — TELEPHONE (OUTPATIENT)
Dept: INTERNAL MEDICINE | Age: 60
End: 2025-06-19

## 2025-06-19 VITALS
OXYGEN SATURATION: 100 % | BODY MASS INDEX: 44.22 KG/M2 | SYSTOLIC BLOOD PRESSURE: 127 MMHG | DIASTOLIC BLOOD PRESSURE: 79 MMHG | WEIGHT: 259 LBS | RESPIRATION RATE: 18 BRPM | TEMPERATURE: 98 F | HEART RATE: 75 BPM | HEIGHT: 64 IN

## 2025-06-19 LAB
GLUCOSE BLD-MCNC: 160 MG/DL (ref 70–99)
HCT VFR BLD AUTO: 35.8 % (ref 35–48)
HGB BLD-MCNC: 12.4 G/DL (ref 12–16)

## 2025-06-19 PROCEDURE — 97530 THERAPEUTIC ACTIVITIES: CPT

## 2025-06-19 PROCEDURE — 97165 OT EVAL LOW COMPLEX 30 MIN: CPT

## 2025-06-19 PROCEDURE — 82962 GLUCOSE BLOOD TEST: CPT

## 2025-06-19 PROCEDURE — 94640 AIRWAY INHALATION TREATMENT: CPT

## 2025-06-19 PROCEDURE — 97116 GAIT TRAINING THERAPY: CPT

## 2025-06-19 PROCEDURE — 85014 HEMATOCRIT: CPT | Performed by: ORTHOPAEDIC SURGERY

## 2025-06-19 PROCEDURE — 97535 SELF CARE MNGMENT TRAINING: CPT

## 2025-06-19 PROCEDURE — 85018 HEMOGLOBIN: CPT | Performed by: ORTHOPAEDIC SURGERY

## 2025-06-19 RX ORDER — FLUTICASONE FUROATE AND VILANTEROL 100; 25 UG/1; UG/1
1 POWDER RESPIRATORY (INHALATION) DAILY PRN
Status: SHIPPED | COMMUNITY
Start: 2025-06-19

## 2025-06-19 NOTE — PLAN OF CARE
POD#1. AxO4. VSS on RA - CRISTIAN w/ own CPAP. Denies nausea during shift, QID accuchecks. Voiding w/o issue. Pain controlled w/ sched pain meds - given PRN flexeril for back discomfort/soreness. Dressing to R hip intact - aquacel x2. Aware of anterior hip precautions. Neurovasc inact. Up min w/ walker. PT rec HH. Poss dc? Updated on POC. Safety measures placed. Care ongoing.

## 2025-06-19 NOTE — PHYSICAL THERAPY NOTE
PHYSICAL THERAPY TREATMENT NOTE - INPATIENT    Room Number: 357/357-A     Session: 1     Number of Visits to Meet Established Goals: 1    Presenting Problem: s/p R MORIS on 25  Co-Morbidities : DM, HTN, asthma, anxiety    PHYSICAL THERAPY ASSESSMENT   Patient demonstrates good  progress this session, goals  progressing with 5/5 met this session.      PT will sign off.   Patient continues to benefit from continued skilled PT services: at discharge to promote prior level of function.  Anticipate patient will return home with home health PT.    PLAN DURING HOSPITALIZATION  Nursing Mobility Recommendation : 1 Assist  PT Device Recommendation: Rolling walker  PT Treatment Plan: Body mechanics, Bed mobility, Endurance, Energy conservation, Patient education, Gait training, Family education, Range of motion, Strengthening, Stoop training, Stair training, Transfer training, Balance training  Frequency (Obs): Daily     CURRENT GOALS     Goal #1  Patient is able to demonstrate supine - sit EOB @ level: supervision  MET   Goal #2  Patient is able to demonstrate transfers Sit to/from Stand at assistance level: supervision  MET   Goal #3    Patient is able to ambulate 150 feet with assistive device at assistance level: supervision  MET   Goal #4    Patient will negotiate 4 stairs/one curb w/ assistive device and supervision  MET   Goal #5    Patient verbalizes and/or demonstrates all precautions and safety concerns independently   MET   Goal #6      Goal Comments: Goals established on 2025 all goals  achieved     SUBJECTIVE  \" I did PT before.\"    OBJECTIVE  Precautions: Bed/chair alarm, MORIS - anterior, Hip abduction pillow (plus no hip flexion past 90 degrees)    WEIGHT BEARING RESTRICTION  R Lower Extremity: Weight Bearing as Tolerated    PAIN ASSESSMENT   Ratin  Location: R hip  Management Techniques: Activity promotion    BALANCE                                                                                                                        Static Sitting: Good  Dynamic Sitting: Fair +           Static Standing: Fair -  Dynamic Standing: Poor +    ACTIVITY TOLERANCE                         O2 WALK       AM-PAC '6-Clicks' INPATIENT SHORT FORM - BASIC MOBILITY  How much difficulty does the patient currently have...  Patient Difficulty: Turning over in bed (including adjusting bedclothes, sheets and blankets)?: A Little   Patient Difficulty: Sitting down on and standing up from a chair with arms (e.g., wheelchair, bedside commode, etc.): None   Patient Difficulty: Moving from lying on back to sitting on the side of the bed?: A Little   How much help from another person does the patient currently need...   Help from Another: Moving to and from a bed to a chair (including a wheelchair)?: None   Help from Another: Need to walk in hospital room?: A Little   Help from Another: Climbing 3-5 steps with a railing?: A Little     AM-PAC Score:  Raw Score: 20   Approx Degree of Impairment: 35.83%   Standardized Score (AM-PAC Scale): 47.67   CMS Modifier (G-Code): CJ    FUNCTIONAL ABILITY STATUS  Gait Assessment   Functional Mobility/Gait Assessment  Gait Assistance: Supervision  Distance (ft): 150x2 with one seated rest  Assistive Device: Rolling walker  Pattern: R Decreased stance time  Stairs: Stairs, Stoop/curb  How Many Stairs: 4  Device: 2 Rails  Assist: Supervision  Pattern: Ascend and Descend  Ascend and Descend : Step to  Stoop/Curb: RW and SBA    Skilled Therapy Provided    Bed Mobility:  Rolling: NT   Supine<>Sit: sup   Sit<>Supine: NT     Transfer Mobility:  Sit<>Stand: sup   Stand<>Sit: sup   Gait: sup with RW.     Therapist's Comments: RN approved session. Patient able to complete training on stairs, stoop, simulated car transfer and gait. Patient tolerated session well with one seated rest.   Able to verbalize hip precautions and pain improved from 7/10 to 6/10 at the end of session.     HEP  given.    Patient End of Session: Up in chair, Needs met, Call light within reach, RN aware of session/findings, All patient questions and concerns addressed, Hospital anti-slip socks, Alarm set    PT Session Time: 30 minutes  Gait Training: 10 minutes  Therapeutic Activity: 20 minutes

## 2025-06-19 NOTE — PROGRESS NOTES
CC: follow-up hospital admission hip replacement    SUBJECTIVE:  Interval History:   No dizziness n/v  Pain controlled    OBJECTIVE:  Scheduled Meds: Scheduled Medications[1]  Continuous Infusions: Medication Infusions[2]  PRN Meds: PRN Medications[3]    PHYSICAL EXAM  Vital signs: Temp:  [97 °F (36.1 °C)-97.9 °F (36.6 °C)] 97.6 °F (36.4 °C)  Pulse:  [58-83] 75  Resp:  [10-22] 18  BP: (100-145)/(61-82) 127/79  SpO2:  [86 %-100 %] 100 %      GENERAL - NAD, AAO  EYES- sclera anicteric,    HENT- normocephalic   NECK - no JVD  CV- RRR  RESP - CTAB, normal resp effort  ABDOMEN- soft, NT/ND   EXT- no LE edema   PSYCH - normal mentation/ normal affect    Data Review:   Labs:   Recent Labs   Lab 06/19/25  0444   HGB 12.4       No results for input(s): \"NA\", \"K\", \"CL\", \"CO2\", \"BUN\", \"CREATSERUM\", \"CA\", \"CAION\", \"MG\", \"PHOS\", \"GLU\" in the last 168 hours.    No results for input(s): \"ALT\", \"AST\", \"ALB\", \"AMYLASE\", \"LIPASE\", \"LDH\" in the last 168 hours.    Invalid input(s): \"ALPHOS\", \"TBIL\", \"DBIL\", \"TPROT\"    Recent Labs   Lab 06/18/25  0836 06/18/25  1803 06/18/25  2136 06/19/25  0522   PGLU 122* 177* 188* 160*           ASSESSMENT/PLAN:  Patient is a 59 year old female with PMH sig for  HTN, hLD, DM 2, asthma, here for scheduled R hip surgery     Impression     -OA sp Non-cemented right total hip arthroplasty 06/18/25  -post op pain     -HTN  -HLD  -Dm2  -morbid obesity bmi 44     Plan     -PT OT  -pain control     -resume cozaar. Bp controlled  -resume statin  -ISS     Scds    Will continue to follow while hospitalized. Please page me or the on-call hospitalist with questions or concerns.    Juanito Sultana Hospitalist  [phone]  Answering Service: [phone]         [1]    sennosides  17.2 mg Oral Nightly    docusate sodium  100 mg Oral BID    ferrous sulfate  325 mg Oral Daily with breakfast    aspirin  81 mg Oral BID    acetaminophen  1,000 mg Oral Q8H EM    ketorolac  30 mg Intravenous Q6H    traMADol  50  mg Oral 4 times per day    buPROPion ER  300 mg Oral Daily    fluticasone-salmeterol  1 puff Inhalation BID    umeclidinium bromide  1 puff Inhalation Daily    montelukast  10 mg Oral Nightly    rosuvastatin  5 mg Oral Nightly    losartan (Cozaar) 50 mg, hydroCHLOROthiazide 12.5 mg for Hyzaar 50/12.5 ([facility] only)   Oral Daily    insulin aspart  1-5 Units Subcutaneous TID AC and HS    melatonin  5 mg Oral Nightly   [2]    lactated ringers Stopped (06/18/25 1900)    lactated ringers Stopped (06/18/25 1900)   [3]   sodium chloride    polyethylene glycol (PEG 3350)    magnesium hydroxide    bisacodyl    fleet enema    ondansetron    prochlorperazine    diphenhydrAMINE **OR** diphenhydrAMINE    cyclobenzaprine    tiZANidine    oxyCODONE **OR** oxyCODONE    HYDROmorphone **OR** HYDROmorphone    albuterol    glucose **OR** glucose **OR** glucose-vitamin C **OR** dextrose **OR** glucose **OR** glucose **OR** glucose-vitamin C     Normal for race Admission

## 2025-06-19 NOTE — PLAN OF CARE
Pt A & O x4, on RA.  CRISTIAN with CPAP.  CPOIS.  Tele-NSR.  ASA 81mg BID.  SCDs.  REgular diet.  Last BM 6/17.  WBAT.  Pt up x sba/walker.  Aquacel dressing CDI.  Gel ice.  Accu checks ACHS.  PT/OT.  DC home after PT.

## 2025-06-19 NOTE — PROGRESS NOTES
[facility]    Kaylee Shaikh Patient Status:  Outpatient in a Bed    8/10/1965 MRN [id number]   Location [facility] 3SW-A Attending Carlo Allen MD   Hosp Day # 0 PCP Swati Gallego MD     Subjective:  RightTotal Hip Arthroplasty  Systemic or Specific Complaints:No Complaints    Objective:  Vital signs in last 24 hours:  Temp:  [97 °F (36.1 °C)-97.9 °F (36.6 °C)] 97.6 °F (36.4 °C)  Pulse:  [58-83] 75  Resp:  [10-22] 18  BP: (100-145)/(61-82) 127/79  SpO2:  [86 %-100 %] 100 %      General: alert, appears stated age and cooperative   Wound: Wound drainage minimal spotting on aquacell   Neurovascular: NVI   DVT Exam: Calf nontender bilaterallly   Lower Extremity Leg lengths approximately equal       Data Review  CBC:   Lab Results   Component Value Date    WBC 5.4 2024    RBC 4.53 2024    HGB 12.4 2025    HCT 35.8 2025    .0 2024       Assessment/Plan:    Right Total Hip Arthroplasty. Doing well postoperatively.          LOS: 0 days

## 2025-06-19 NOTE — OCCUPATIONAL THERAPY NOTE
OCCUPATIONAL THERAPY EVALUATION - INPATIENT    Room Number: 357/357-A  Evaluation Date: 6/19/2025     Type of Evaluation: Initial  Presenting Problem: s/p RIGHT TOTAL HIP ARTHROPLASTY 6/18 Dr. Allen    Physician Order: IP Consult to Occupational Therapy  Reason for Therapy:  ADL/IADL Dysfunction and Discharge Planning    OCCUPATIONAL THERAPY ASSESSMENT   Patient is a 59 year old female admitted on 6/18/2025 with Presenting Problem: s/p RIGHT TOTAL HIP ARTHROPLASTY 6/18 Dr. Allen. Co-Morbidities : DM, HTN, asthma, anxiety  Patient is currently functioning near baseline with toileting, upper body dressing, lower body dressing, grooming, transfers, static sitting balance, dynamic sitting balance, static standing balance, dynamic standing balance, maintaining seated position, functional standing tolerance, energy conservation strategies, and aerobic capacity.  Prior to admission, patient's baseline is IND with ADLs/IADLs.  Patient met all OT goals at supervision level.  Patient reports no further questions/concerns at this time.     Patient will be discharged from acute inpatient OT services at this time.    Recommendations for nursing staff:   Transfers: up x 1 assist, supervision, RW  Toileting location: Toilet    EVALUATION SESSION:  Patient at start of session: seated upright in chair    FUNCTIONAL TRANSFER ASSESSMENT  Sit to Stand: Chair  Chair: Supervision  Toilet Transfer: Stand-by Assist    BED MOBILITY     BALANCE ASSESSMENT  Static Sitting: Supervision  Static Standing: Supervision    FUNCTIONAL ADL ASSESSMENT  Grooming Standing: Supervision (brushed hair + brushed teeth at sink)  UB Dressing Seated: Modified Independent (shirt)  LB Dressing Seated: Supervision (donned brief, socks, pants, used reacher + sock aide)  LB Dressing Standing: Supervision  Toileting Seated: Modified Independent    ACTIVITY TOLERANCE: WFL                         O2 SATURATIONS  Oxygen Therapy  SPO2% on Room Air at Rest:  95    COGNITION  Overall Cognitive Status:  WFL - within functional limits    COGNITION ASSESSMENTS     Upper Extremity:   ROM: within functional limits  Strength: is within functional limits    EDUCATION PROVIDED  Patient Education : Role of Occupational Therapy; Plan of Care; Discharge Recommendations; DME Recommendations; Functional Transfer Techniques; Fall Prevention; Weight Bear Status; Surgical Precautions; Posture/Positioning; Proper Body Mechanics; Energy Conservation  Patient's Response to Education: Verbalized Understanding; Returned Demonstration    Equipment used: RW, reacher, sock aide, raised over toilet commode  Demonstrates functional use    Therapist comments: RN cleared pt for session, received sitting upright in chair. Pt educated on anterior hip precautions + no hip flexion past 90 degrees, pt verbalized understanding, demonstrated good active recall of precautions throughout session. Pt educated on use of AE/DME to adhere to precautions during ADL, pt trialed use of reacher and sock aide this date for LB dressing. Pt states her spouse is able to assist with ADLs as needed during recovery.    Patient End of Session: Up in chair, Needs met, Call light within reach, RN aware of session/findings, All patient questions and concerns addressed, Hospital anti-slip socks, Alarm set    OCCUPATIONAL PROFILE    HOME SITUATION  Type of Home: House  Home Layout: Two level  Lives With: Spouse (dog-kiko)    Toilet and Equipment: Comfort height toilet  Shower/Tub and Equipment: Walk-in shower, Hand-held showerhead (built in bench)  Other Equipment: Reacher (RW)    Occupation/Status: Working full time-,  for [employer]  Hand Dominance: Right  Drives: Yes  Patient Regularly Uses: None (has RW)    Prior Level of Function: IND with ADLs/IADLs, works full time, lives at home with supportive spouse and their gómez doodle Kiko.    SUBJECTIVE  \"We've redone all the bathrooms\"    PAIN  ASSESSMENT  Ratin  Location: R hip area  Management Techniques: Repositioning, Relaxation, Breathing techniques    OBJECTIVE  Precautions: Bed/chair alarm, MORIS - anterior, Hip abduction pillow (plus no hip flexion past 90 degrees)  Fall Risk: High fall risk    WEIGHT BEARING RESTRICTION  R Lower Extremity: Weight Bearing as Tolerated      AM-PAC ‘6-Clicks’ Inpatient Daily Activity Short Form  -   Putting on and taking off regular lower body clothing?: None  -   Bathing (including washing, rinsing, drying)?: None  -   Toileting, which includes using toilet, bedpan or urinal? : None  -   Putting on and taking off regular upper body clothing?: None  -   Taking care of personal grooming such as brushing teeth?: None  -   Eating meals?: None    AM-PAC Score:  Score: 24  Approx Degree of Impairment: 0%  Standardized Score (AM-PAC Scale): 57.54    ADDITIONAL TESTS     NEUROLOGICAL FINDINGS      PLAN   Patient has been evaluated and presents with no skilled Occupational Therapy needs at this time.  Patient discharged from Occupational Therapy services.  Please re-order if a new functional limitation presents during this admission.    OT Device Recommendations: Reacher, Sock aid, Long-handled sponge, Long-handled shoehorn, Raised toilet seat, Shower chair    Patient Evaluation Complexity Level:   Occupational Profile/Medical History LOW - Brief history including review of medical or therapy records    Specific performance deficits impacting engagement in ADL/IADL LOW  1 - 3 performance deficits    Client Assessment/Performance Deficits LOW - No comorbidities nor modifications of tasks    Clinical Decision Making LOW - Analysis of occupational profile, problem-focused assessments, limited treatment options    Overall Complexity LOW     OT Session Time: 30 minutes  Self-Care Home Management: 18 minutes

## 2025-06-19 NOTE — PROGRESS NOTES
AVS reviewed, IV removed , discharge video viewed, will dc home w/ [facility] once meds delivered, spouse at bedside, both verbalized understanding of discharge instructions.

## 2025-06-30 RX ORDER — MONTELUKAST SODIUM 10 MG/1
10 TABLET ORAL DAILY
Qty: 90 TABLET | Refills: 1 | Status: SHIPPED | OUTPATIENT
Start: 2025-06-30

## 2025-07-23 ENCOUNTER — TELEPHONE (OUTPATIENT)
Dept: OTHER | Age: 60
End: 2025-07-23

## 2025-07-23 ENCOUNTER — E-ADVICE (OUTPATIENT)
Dept: INTERNAL MEDICINE | Age: 60
End: 2025-07-23

## (undated) DEVICE — DRAPE,U/SHT,SPLIT,FILM,60X84,STERILE: Brand: MEDLINE

## (undated) DEVICE — HOOD, PEEL-AWAY: Brand: FLYTE

## (undated) DEVICE — SUT VCRL + 1 27IN ABSRB UD OS-6 L36MM

## (undated) DEVICE — COVER,LIGHT,CAMERA,HARD,1/PK,STRL: Brand: MEDLINE

## (undated) DEVICE — 40409 ABDUCTION PILLOW MEDIUM: Brand: 40409 ABDUCTION PILLOW MEDIUM

## (undated) DEVICE — BNDG,ELSTC,MATRIX,STRL,6"X5YD,LF,HOOK&LP: Brand: MEDLINE

## (undated) DEVICE — BIPOLAR SEALER 23-112-1 AQM 6.0: Brand: AQUAMANTYS ®

## (undated) DEVICE — GOWN SUR 2XL LEV 4 BLU W/ WHT V NK BND AERO

## (undated) DEVICE — GLOVE SUR 8 SENSICARE PI PIP CRM PWD F

## (undated) DEVICE — HOOD: Brand: FLYTE

## (undated) DEVICE — ANTIBACTERIAL UNDYED BRAIDED (POLYGLACTIN 910), SYNTHETIC ABSORBABLE SUTURE: Brand: COATED VICRYL

## (undated) DEVICE — 2T11 #2 PDO 36 X 36: Brand: 2T11 #2 PDO 36 X 36

## (undated) DEVICE — SYRINGE MED 30ML STD CLR PLAS LL TIP N CTRL

## (undated) DEVICE — APPLICATOR PREP 26ML CHG 2% ISO ALC 70%

## (undated) DEVICE — NEPTUNE E-SEP SMOKE EVACUATION PENCIL, COATED, 70MM BLADE, PUSH BUTTON SWITCH: Brand: NEPTUNE E-SEP

## (undated) DEVICE — GLOVE SUR 8.5 SENSICARE PI PIP GRN PWD F

## (undated) DEVICE — SLEEVE COMPR MD KNEE LEN SGL USE KENDALL SCD

## (undated) DEVICE — 450 ML BOTTLE OF 0.05% CHLORHEXIDINE GLUCONATE IN 99.95% STERILE WATER FOR IRRIGATION, USP AND APPLICATOR.: Brand: IRRISEPT ANTIMICROBIAL WOUND LAVAGE

## (undated) DEVICE — BLADE ELECTRODE: Brand: EDGE

## (undated) DEVICE — GLOVE SUR 8.5 SENSICARE PI PIP CRM PWD F

## (undated) DEVICE — STRYKER PERFORMANCE SERIES SAGITTAL BLADE: Brand: STRYKER PERFORMANCE SERIES

## (undated) DEVICE — TOTAL HIP CDS: Brand: MEDLINE INDUSTRIES, INC.

## (undated) DEVICE — SOLUTION IRRIG 3000ML 0.9% NACL FLX CONT

## (undated) DEVICE — NEEDLE SPNL 18GA L3.5IN PNK QNCKE STYL DISP

## (undated) NOTE — LETTER
02/20/20    Self-Care for Sore Throats    Sore throats happen for many reasons, such as colds, allergies, and infections caused by viruses or bacteria. In any case, your throat becomes red and sore.  Your goal for self-care is to reduce your discomfort whil · A temperature over 101°F (38.3°C)  · White spots on the throat  · Great difficulty swallowing  · Trouble breathing  · A skin rash  · Recent exposure to someone else with strep bacteria  · Severe hoarseness and swollen glands in the neck or jaw  Date Last Most people get colds in the winter and spring, but it is possible to get a cold any time of the year. Symptoms usually include:    *sore throat  *runny nose  *coughing  *sneezing  *headaches  *body aches    Most people recover within about 7-10 days.  [facility] Avoid close contact with others, such as hugging, kissing, or shaking hands. Move away from people before coughing or sneezing.   Cough and sneeze into a tissue then throw it away, or cough and sneeze into your upper shirt sleeve, completely covering your Causes of the Common Cold  Many different respiratory viruses can cause the common cold, but rhinoviruses are the most common. Rhinoviruses can also trigger asthma attacks and have been linked to sinus and ear infections.  Other viruses that can cause colds

## (undated) NOTE — LETTER
OUTSIDE TESTING RESULT REQUEST     IMPORTANT: FOR YOUR IMMEDIATE ATTENTION  Please FAX all test results listed below to: [phone]     Testing already done on or about: Appointment 6/10/2025 @ 1530     * * * * If testing is NOT complete, arrange with patient A.S.A.P. * * * *      Patient Name: Kaylee Shaikh  Surgery Date: 2025  Medical Record: [id number]  CSN: [id number]  : 8/10/1965 - A: 59 y     Sex: female  Surgeon(s):  Carlo Allen MD  Procedure: RIGHT TOTAL HIP ARTHROPLASTY  Anesthesia Type: Choice     Surgeon: Carlo Allen MD     The following Testing and Time Line are REQUIRED PER ANESTHESIA     EKG READ AND SIGNED WITHIN   90 days  CBC, Platelet; NO Differential within  90 days  BMP (requires 4 hour fast) within  90 days      Thank You,   Sent by: Keisha BAIRD

## (undated) NOTE — LETTER
Self-Care for Sore Throats    Sore throats happen for many reasons, such as colds, allergies, and infections caused by viruses or bacteria. In any case, your throat becomes red and sore.  Your goal for self-care is to reduce your discomfort while giving y · A temperature over 101°F (38.3°C)  · White spots on the throat  · Great difficulty swallowing  · Trouble breathing  · A skin rash  · Recent exposure to someone else with strep bacteria  · Severe hoarseness and swollen glands in the neck or jaw  Date Last Most people get colds in the winter and spring, but it is possible to get a cold any time of the year. Symptoms usually include:    *sore throat  *runny nose  *coughing  *sneezing  *headaches  *body aches    Most people recover within about 7-10 days.  [facility] Avoid close contact with others, such as hugging, kissing, or shaking hands. Move away from people before coughing or sneezing.   Cough and sneeze into a tissue then throw it away, or cough and sneeze into your upper shirt sleeve, completely covering your Causes of the Common Cold  Many different respiratory viruses can cause the common cold, but rhinoviruses are the most common. Rhinoviruses can also trigger asthma attacks and have been linked to sinus and ear infections.  Other viruses that can cause colds

## (undated) NOTE — LETTER
Patient Name: Kaylee Shaikh-Age / Sex: 8/10/1965-A: 59 y  female   Medical Records: [id number] CSN: [id number]    ABNORMAL VALUES  Surgeon(s):  Carlo Allen MD  Anesthesia Type: Choice  Date of Surgery: 2025  Procedure Description: RIGHT TOTAL HIP ARTHROPLASTY  Primary Surgeon:  Carlo Allen MD  Phone Number: [phone]    PLEASE NOTE THE FOLLOWING ABNORMALITIES:   Chemistry  elevated Glucose 188, not diabetic  ________________________________________________________  Anesthesia to review patient's chart